# Patient Record
Sex: FEMALE | Race: WHITE | Employment: FULL TIME | URBAN - METROPOLITAN AREA
[De-identification: names, ages, dates, MRNs, and addresses within clinical notes are randomized per-mention and may not be internally consistent; named-entity substitution may affect disease eponyms.]

---

## 2017-04-26 DIAGNOSIS — E03.9 HYPOTHYROIDISM, UNSPECIFIED TYPE: ICD-10-CM

## 2017-04-26 RX ORDER — LEVOTHYROXINE SODIUM 100 UG/1
TABLET ORAL
Qty: 30 TAB | Refills: 0 | Status: SHIPPED | OUTPATIENT
Start: 2017-04-26 | End: 2017-06-08 | Stop reason: DRUGHIGH

## 2017-06-07 ENCOUNTER — HOSPITAL ENCOUNTER (OUTPATIENT)
Dept: LAB | Age: 52
Discharge: HOME | End: 2017-06-07

## 2017-06-07 ENCOUNTER — OFFICE VISIT (OUTPATIENT)
Dept: FAMILY MEDICINE CLINIC | Age: 52
End: 2017-06-07

## 2017-06-07 VITALS
TEMPERATURE: 96.7 F | SYSTOLIC BLOOD PRESSURE: 182 MMHG | DIASTOLIC BLOOD PRESSURE: 83 MMHG | WEIGHT: 208 LBS | HEIGHT: 64 IN | OXYGEN SATURATION: 98 % | RESPIRATION RATE: 16 BRPM | BODY MASS INDEX: 35.51 KG/M2 | HEART RATE: 68 BPM

## 2017-06-07 DIAGNOSIS — R73.03 PRE-DIABETES: ICD-10-CM

## 2017-06-07 DIAGNOSIS — I15.9 SECONDARY HYPERTENSION: ICD-10-CM

## 2017-06-07 DIAGNOSIS — Z72.0 TOBACCO ABUSE: ICD-10-CM

## 2017-06-07 DIAGNOSIS — E03.9 ACQUIRED HYPOTHYROIDISM: ICD-10-CM

## 2017-06-07 DIAGNOSIS — G89.29 CHRONIC BILATERAL BACK PAIN, UNSPECIFIED BACK LOCATION: ICD-10-CM

## 2017-06-07 DIAGNOSIS — Z00.00 REGULAR CHECK-UP: Primary | ICD-10-CM

## 2017-06-07 DIAGNOSIS — M54.9 CHRONIC BILATERAL BACK PAIN, UNSPECIFIED BACK LOCATION: ICD-10-CM

## 2017-06-07 DIAGNOSIS — Z12.11 COLON CANCER SCREENING: ICD-10-CM

## 2017-06-07 DIAGNOSIS — M43.15 SPONDYLOLISTHESIS OF THORACOLUMBAR REGION: ICD-10-CM

## 2017-06-07 LAB
ALBUMIN SERPL BCP-MCNC: 3.9 G/DL (ref 3.4–5)
ALBUMIN/GLOB SERPL: 1.3 {RATIO} (ref 0.8–1.7)
ALP SERPL-CCNC: 55 U/L (ref 45–117)
ALT SERPL-CCNC: 16 U/L (ref 13–56)
ANION GAP BLD CALC-SCNC: 8 MMOL/L (ref 3–18)
AST SERPL W P-5'-P-CCNC: 11 U/L (ref 15–37)
BASOPHILS # BLD AUTO: 0.1 K/UL (ref 0–0.06)
BASOPHILS # BLD: 1 % (ref 0–2)
BILIRUB SERPL-MCNC: 0.5 MG/DL (ref 0.2–1)
BUN SERPL-MCNC: 12 MG/DL (ref 7–18)
BUN/CREAT SERPL: 15 (ref 12–20)
CALCIUM SERPL-MCNC: 9.1 MG/DL (ref 8.5–10.1)
CHLORIDE SERPL-SCNC: 106 MMOL/L (ref 100–108)
CHOLEST SERPL-MCNC: 302 MG/DL
CO2 SERPL-SCNC: 27 MMOL/L (ref 21–32)
CREAT SERPL-MCNC: 0.82 MG/DL (ref 0.6–1.3)
DIFFERENTIAL METHOD BLD: ABNORMAL
EOSINOPHIL # BLD: 0.5 K/UL (ref 0–0.4)
EOSINOPHIL NFR BLD: 5 % (ref 0–5)
ERYTHROCYTE [DISTWIDTH] IN BLOOD BY AUTOMATED COUNT: 13.8 % (ref 11.6–14.5)
EST. AVERAGE GLUCOSE BLD GHB EST-MCNC: 123 MG/DL
GLOBULIN SER CALC-MCNC: 3 G/DL (ref 2–4)
GLUCOSE POC: 97 MG/DL
GLUCOSE SERPL-MCNC: 95 MG/DL (ref 74–99)
HBA1C MFR BLD: 5.9 % (ref 4.2–5.6)
HCT VFR BLD AUTO: 47.9 % (ref 35–45)
HDLC SERPL-MCNC: 39 MG/DL (ref 40–60)
HDLC SERPL: 7.7 {RATIO} (ref 0–5)
HGB BLD-MCNC: 15.3 G/DL (ref 12–16)
LDLC SERPL CALC-MCNC: 211.6 MG/DL (ref 0–100)
LIPID PROFILE,FLP: ABNORMAL
LYMPHOCYTES # BLD AUTO: 26 % (ref 21–52)
LYMPHOCYTES # BLD: 2.2 K/UL (ref 0.9–3.6)
MCH RBC QN AUTO: 29.1 PG (ref 24–34)
MCHC RBC AUTO-ENTMCNC: 31.9 G/DL (ref 31–37)
MCV RBC AUTO: 91.2 FL (ref 74–97)
MONOCYTES # BLD: 0.5 K/UL (ref 0.05–1.2)
MONOCYTES NFR BLD AUTO: 6 % (ref 3–10)
NEUTS SEG # BLD: 5.3 K/UL (ref 1.8–8)
NEUTS SEG NFR BLD AUTO: 62 % (ref 40–73)
PLATELET # BLD AUTO: 269 K/UL (ref 135–420)
PMV BLD AUTO: 10.9 FL (ref 9.2–11.8)
POTASSIUM SERPL-SCNC: 4.2 MMOL/L (ref 3.5–5.5)
PROT SERPL-MCNC: 6.9 G/DL (ref 6.4–8.2)
RBC # BLD AUTO: 5.25 M/UL (ref 4.2–5.3)
SODIUM SERPL-SCNC: 141 MMOL/L (ref 136–145)
T4 FREE SERPL-MCNC: 1.1 NG/DL (ref 0.7–1.5)
TRIGL SERPL-MCNC: 257 MG/DL (ref ?–150)
TSH SERPL DL<=0.05 MIU/L-ACNC: 10.3 UIU/ML (ref 0.36–3.74)
VLDLC SERPL CALC-MCNC: 51.4 MG/DL
WBC # BLD AUTO: 8.5 K/UL (ref 4.6–13.2)

## 2017-06-07 PROCEDURE — 84439 ASSAY OF FREE THYROXINE: CPT | Performed by: NURSE PRACTITIONER

## 2017-06-07 PROCEDURE — 85025 COMPLETE CBC W/AUTO DIFF WBC: CPT | Performed by: NURSE PRACTITIONER

## 2017-06-07 PROCEDURE — 80061 LIPID PANEL: CPT | Performed by: NURSE PRACTITIONER

## 2017-06-07 PROCEDURE — 84443 ASSAY THYROID STIM HORMONE: CPT | Performed by: NURSE PRACTITIONER

## 2017-06-07 PROCEDURE — 83036 HEMOGLOBIN GLYCOSYLATED A1C: CPT | Performed by: NURSE PRACTITIONER

## 2017-06-07 PROCEDURE — 80053 COMPREHEN METABOLIC PANEL: CPT | Performed by: NURSE PRACTITIONER

## 2017-06-07 RX ORDER — AMLODIPINE BESYLATE 5 MG/1
5 TABLET ORAL DAILY
Qty: 30 TAB | Refills: 3 | Status: SHIPPED | OUTPATIENT
Start: 2017-06-07 | End: 2017-12-06 | Stop reason: SDUPTHER

## 2017-06-07 RX ORDER — PHENOL/SODIUM PHENOLATE
20 AEROSOL, SPRAY (ML) MUCOUS MEMBRANE DAILY
Qty: 30 TAB | Refills: 3 | Status: SHIPPED | OUTPATIENT
Start: 2017-06-07 | End: 2017-12-06

## 2017-06-07 RX ORDER — NAPROXEN 500 MG/1
500 TABLET ORAL 2 TIMES DAILY WITH MEALS
Qty: 30 TAB | Refills: 3 | Status: ON HOLD | OUTPATIENT
Start: 2017-06-07 | End: 2019-11-26

## 2017-06-07 NOTE — PROGRESS NOTES
The doctor would like you to complete your mammogram and pap screening exams. You may contact [facility] for a  Free Mammogram and Pap Smear. To schedule an appointment call  [phone]  and you will be referred to the [facility] [address] [facility]  nearest you. At your appointment provide the  [address] [place] [address] as your primary care physician and they will forward your test results for inclusion in your medical records     Gave patient above info, along with FIT test with instruction, labs obtained.

## 2017-06-07 NOTE — PROGRESS NOTES
HPI  Patricia Polo is a 46 y.o. female being seen today for htn follow up. Stopped lisinopril due to coughing. Also having a lot of back pain. Worried it's bone cancer  Because that is what her mother had. Chief Complaint   Patient presents with    Medication Evaluation     Lisinopril- Coughing   . she states that she is taking her levothyroxine daily for over the past month. Tried to quit smoking and wants to do it cold turkey rather than with medication. Past Medical History:   Diagnosis Date    Diabetes ([address]     GERD (gastroesophageal reflux disease)     Hypercholesterolemia     Hypertension     Thyroid disease     hypothyroid         ROS  Patient states that she is feeling well. Denies complaints of chest pain, shortness of breath, swelling of legs, dizziness or weakness. she denies nausea, vomiting or diarrhea. Current Outpatient Prescriptions   Medication Sig    naproxen (NAPROSYN) 500 mg tablet Take 1 Tab by mouth two (2) times daily (with meals).  amLODIPine (NORVASC) 5 mg tablet Take 1 Tab by mouth daily.  Omeprazole delayed release (PRILOSEC D/R) 20 mg tablet Take 1 Tab by mouth daily.  levothyroxine (SYNTHROID) 100 mcg tablet TAKE ONE TABLET BY MOUTH ONCE DAILY BEFORE BREAKFAST    Omeprazole delayed release (PRILOSEC D/R) 20 mg tablet Take 1 Tab by mouth daily.  ibuprofen (MOTRIN) 200 mg tablet Take 3 Tabs by mouth every six (6) hours as needed for Pain.  lisinopril (PRINIVIL, ZESTRIL) 20 mg tablet Take 1 Tab by mouth daily.  omeprazole (PRILOSEC) 20 mg capsule Take 20 mg by mouth daily. No current facility-administered medications for this visit. PE  Visit Vitals    /83 (BP 1 Location: Left arm, BP Patient Position: Sitting)    Pulse 68    Temp 96.7 °F (35.9 °C) (Oral)    Resp 16    Ht 5' 4\" (1.626 m)    Wt 208 lb (94.3 kg)    SpO2 98%    BMI 35.7 kg/m2        Alert and oriented with normal mood and affect.  she is well developed and well nourished . Lungs are clear without wheezing. Heart rate is regular without murmurs or gallops. There is no lower extremity edema. Thyroid exam normal and no carotid bruits bilaterally. Results for orders placed or performed in visit on 06/07/17   AMB POC GLUCOSE BLOOD, BY GLUCOSE MONITORING DEVICE   Result Value Ref Range    Glucose POC 97 mg/dL         Assessment and Plan:        ICD-10-CM ICD-9-CM    1. Regular check-up Z00.00 V70.0 AMB POC GLUCOSE BLOOD, BY GLUCOSE MONITORING DEVICE   2. Secondary hypertension N50.4 985.10 METABOLIC PANEL, COMPREHENSIVE      LIPID PANEL   3. Chronic bilateral back pain, unspecified back location M54.9 724.5     G89.29 338.29    4. Colon cancer screening Z12.11 V76.51 OCCULT BLOOD, IMMUNOASSAY (FIT)      OCCULT BLOOD, IMMUNOASSAY (FIT)   5. Acquired hypothyroidism E03.9 244.9 TSH 3RD GENERATION      T4, FREE   6. Pre-diabetes R73.03 790.29 CBC WITH AUTOMATED DIFF      HEMOGLOBIN A1C WITH EAG   7. Spondylolisthesis of thoracolumbar region M43.15 738.4    8. Tobacco abuse Z72.0 305. 1    pt chose amlodipine over losartan due to cost  Labs  Discussed CAD risk with mother's hx  Reviewed all images previously - hx of spondylolysis and arthritis in back so will try exercises first  Try naproxen over ibuprofen for pain  Pt concerned about side effects of lipid medications such as statins  Advised to quit smoking      Asiya Carpenter NP

## 2017-06-07 NOTE — PROGRESS NOTES
No chief complaint on file. 1. Have you been to the ER, urgent care clinic since your last visit? Hospitalized since your last visit? No    2. Have you seen or consulted any other health care providers outside of the [address] [place] [address] since your last visit? No    3. When was your last Pap smear? No  When was your last Mammogram? No  When was your last Colon screening? No    PMH/FH/Social Hx reviewed and updated as needed      Applicable screenings reviewed and updated as needed  Medication reconciliation performed. Patient does need medication refills. [employer]  Health Maintenance reviewed.

## 2017-06-07 NOTE — PATIENT INSTRUCTIONS
You have been referred for specialty care at  [address] [place] [address] in [place]. Services are provided at a sliding scale rate based on your income. Please call [phone] or [phone] to start the screening process. You can leave them a message with your information, and you will receive a call back. They will only try to contact you two times, so if you miss the call or have not heard from them 2 weeks after your call, please call and leave another message. Once you receive your letter of acceptance, bring it to the [place] [phone] and we can schedule your appointment. Ankylosing Spondylitis: Exercises  Your Care Instructions  Here are some examples of typical rehabilitation exercises for your condition. Start each exercise slowly. Ease off the exercise if you start to have pain. Your doctor or physical therapist will tell you when you can start these exercises and which ones will work best for you. How to do the exercises  Back stretches    Note: Exercises for ankylosing spondylitis should be gentle and frequent. It is good to do these exercises twice each day. Do not do them first thing in the morning, when you may feel more stiff. 1. Get down on your hands and knees on the floor. 2. Relax your head and allow it to droop. 3. Round your back up toward the ceiling until you feel a nice stretch in your upper, middle, and lower back. 4. Hold this stretch for as long as it feels comfortable, or about 15 to 30 seconds. 5. Return to the starting position with a flat back while you are on your hands and knees. 6. Let your back sway by pressing your stomach toward the floor. Lift your buttocks toward the ceiling. 7. Hold each position for 15 to 30 seconds. Repeat 2 to 4 times. Chest expansion    1. Sit comfortably with your feet shoulder-width apart. You can also do this exercise standing up. 2. Look straight ahead, and do not allow your head to tilt back.  As you take a deep breath, open your arms out to the sides and roll your arms back. Your palms will turn outward, and you will feel a stretch across your chest.  3. Breathe normally as you hold this stretch for 15 to 30 seconds. 4. Lower your arms to your sides and let your palms turn back toward your legs as you slowly let out your breath. 5. Repeat 2 to 4 times. Upper back and shoulder stretch    1. Stand up straight, or sit in a firm chair. 2. Looking straight ahead, breathe in as you raise both arms over your head and reach toward the ceiling. Do not allow your head to tilt back. 3. Reach back with your arms to stretch your shoulders. 4. Breathe normally as you hold this stretch for 15 to 30 seconds. 5. Return to the starting position. 6. Repeat 2 to 4 times. Neck stretches    1. Sit in a firm chair, or stand up straight. 2. Keeping your chin level, turn your head to the right, and hold for 15 to 30 seconds. 3. Turn your head to the left and hold for 15 to 30 seconds. 4. Repeat 2 to 4 times to each side. 5. Next, you can do some head tilts. Keeping your chin pointing straight ahead, tip your right ear to your right shoulder, and hold for 15 to 30 seconds. 6. Tilt your head to the left and hold for 15 to 30 seconds. 7. Repeat 2 to 4 times to each side. Press-up back extension    1. Lie on your stomach, supporting your body with your forearms. 2. Press your elbows down into the floor to raise your upper back. As you do this, relax your stomach muscles and allow your back to arch without using your back muscles. As your press up, do not let your hips or pelvis come off the floor. 3. Hold for 15 to 30 seconds, then relax. 4. Repeat 2 to 4 times. Alternate arm and leg (bird dog) exercise    Note: Do this exercise slowly. Try to keep your body straight at all times, and do not let one hip drop lower than the other. 1. Start on the floor, on your hands and knees. 2. Tighten your belly muscles.   3. Raise one leg off the floor, and hold it straight out behind you. Be careful not to let your hip drop down, because that will twist your trunk. 4. Hold for about 6 seconds, then lower your leg and switch to the other leg. 5. Repeat 8 to 12 times on each leg. 6. Over time, work up to holding for 10 to 30 seconds each time. 7. If you feel stable and secure with your leg raised, try raising the opposite arm straight out in front of you at the same time. Follow-up care is a key part of your treatment and safety. Be sure to make and go to all appointments, and call your doctor if you are having problems. It's also a good idea to know your test results and keep a list of the medicines you take. Where can you learn more? Go to http://ralph-paige.info/. Enter L070 in the search box to learn more about \"Ankylosing Spondylitis: Exercises. \"  Current as of: May 23, 2016  Content Version: 11.2  © 7029-3007 [employer]. Care instructions adapted under license by [employer] (which disclaims liability or warranty for this information). If you have questions about a medical condition or this instruction, always ask your healthcare professional. [address] any warranty or liability for your use of this information.

## 2017-06-08 RX ORDER — LEVOTHYROXINE SODIUM 112 UG/1
112 TABLET ORAL
Qty: 30 TAB | Refills: 3 | Status: SHIPPED | OUTPATIENT
Start: 2017-06-08 | End: 2017-12-06 | Stop reason: SDUPTHER

## 2017-06-13 DIAGNOSIS — E78.5 HYPERLIPIDEMIA, UNSPECIFIED HYPERLIPIDEMIA TYPE: Primary | ICD-10-CM

## 2017-06-13 RX ORDER — ATORVASTATIN CALCIUM 20 MG/1
20 TABLET, FILM COATED ORAL DAILY
Qty: 30 TAB | Refills: 3 | Status: SHIPPED | OUTPATIENT
Start: 2017-06-13 | End: 2017-12-06 | Stop reason: SDUPTHER

## 2017-06-19 ENCOUNTER — HOSPITAL ENCOUNTER (OUTPATIENT)
Dept: LAB | Age: 52
Discharge: HOME | End: 2017-06-19

## 2017-06-19 PROCEDURE — 82274 ASSAY TEST FOR BLOOD FECAL: CPT | Performed by: NURSE PRACTITIONER

## 2017-06-25 LAB — HEMOCCULT STL QL IA: NEGATIVE

## 2017-12-06 ENCOUNTER — OFFICE VISIT (OUTPATIENT)
Dept: FAMILY MEDICINE CLINIC | Age: 52
End: 2017-12-06

## 2017-12-06 VITALS
RESPIRATION RATE: 16 BRPM | OXYGEN SATURATION: 98 % | WEIGHT: 214 LBS | SYSTOLIC BLOOD PRESSURE: 158 MMHG | HEIGHT: 64 IN | BODY MASS INDEX: 36.54 KG/M2 | HEART RATE: 71 BPM | DIASTOLIC BLOOD PRESSURE: 79 MMHG | TEMPERATURE: 97.9 F

## 2017-12-06 DIAGNOSIS — Z00.00 REGULAR CHECK-UP: Primary | ICD-10-CM

## 2017-12-06 DIAGNOSIS — I15.9 SECONDARY HYPERTENSION: ICD-10-CM

## 2017-12-06 DIAGNOSIS — Z00.00 HEALTHCARE MAINTENANCE: ICD-10-CM

## 2017-12-06 DIAGNOSIS — E03.9 ACQUIRED HYPOTHYROIDISM: ICD-10-CM

## 2017-12-06 DIAGNOSIS — E78.5 HYPERLIPIDEMIA, UNSPECIFIED HYPERLIPIDEMIA TYPE: ICD-10-CM

## 2017-12-06 LAB
BILIRUB UR QL STRIP: NEGATIVE
GLUCOSE POC: 127 MG/DL
GLUCOSE UR-MCNC: NEGATIVE MG/DL
KETONES P FAST UR STRIP-MCNC: NEGATIVE MG/DL
PH UR STRIP: 6 [PH] (ref 4.6–8)
PROT UR QL STRIP: NEGATIVE
SP GR UR STRIP: 1 (ref 1–1.03)
UA UROBILINOGEN AMB POC: NORMAL (ref 0.2–1)
URINALYSIS CLARITY POC: CLEAR
URINALYSIS COLOR POC: YELLOW
URINE BLOOD POC: NEGATIVE
URINE LEUKOCYTES POC: NEGATIVE
URINE NITRITES POC: NEGATIVE

## 2017-12-06 RX ORDER — ATORVASTATIN CALCIUM 20 MG/1
20 TABLET, FILM COATED ORAL DAILY
Qty: 30 TAB | Refills: 5 | Status: SHIPPED | OUTPATIENT
Start: 2017-12-06 | End: 2019-06-13 | Stop reason: ALTCHOICE

## 2017-12-06 RX ORDER — LEVOTHYROXINE SODIUM 112 UG/1
112 TABLET ORAL
Qty: 30 TAB | Refills: 5 | Status: SHIPPED | OUTPATIENT
Start: 2017-12-06 | End: 2019-06-13 | Stop reason: SDUPTHER

## 2017-12-06 RX ORDER — AMLODIPINE BESYLATE 5 MG/1
5 TABLET ORAL DAILY
Qty: 30 TAB | Refills: 5 | Status: SHIPPED | OUTPATIENT
Start: 2017-12-06 | End: 2019-06-13 | Stop reason: ALTCHOICE

## 2017-12-06 NOTE — PROGRESS NOTES
Discharge instructions reviewed with patient    Medication list and understanding of medications reviewed with patient. OTC and herbal medications reviewed and added to med list if applicable  Barriers to adherence assessed. Guidance given regarding new medications this visit, including reason for taking this medicine, and common side effects. Given AVS and Good RX card.

## 2017-12-06 NOTE — PROGRESS NOTES
HPI  Evelia Merlin is a 45 y.o. female being seen today for   Chief Complaint   Patient presents with    Medication Refill   .  she states that she needs med refill on synthroid, blood pressure med and lipitor. Takes meds inconsistently and still smoking 1ppd. Did not take bp meds today    Past Medical History:   Diagnosis Date    Diabetes ([address]     GERD (gastroesophageal reflux disease)     Hypercholesterolemia     Hypertension     Thyroid disease     hypothyroid         ROS  Patient states that she is feeling well. Denies complaints of chest pain, shortness of breath, swelling of legs, dizziness or weakness. she denies nausea, vomiting or diarrhea. Current Outpatient Prescriptions   Medication Sig    atorvastatin (LIPITOR) 20 mg tablet Take 1 Tab by mouth daily.  levothyroxine (SYNTHROID) 112 mcg tablet Take 1 Tab by mouth Daily (before breakfast).  amLODIPine (NORVASC) 5 mg tablet Take 1 Tab by mouth daily.  naproxen (NAPROSYN) 500 mg tablet Take 1 Tab by mouth two (2) times daily (with meals).  Omeprazole delayed release (PRILOSEC D/R) 20 mg tablet Take 1 Tab by mouth daily. No current facility-administered medications for this visit. PE  Visit Vitals    /79 (BP 1 Location: Left arm, BP Patient Position: Sitting)    Pulse 71    Temp 97.9 °F (36.6 °C) (Oral)    Resp 16    Ht 5' 4\" (1.626 m)    Wt 214 lb (97.1 kg)    SpO2 98%    BMI 36.73 kg/m2        Alert and oriented with normal mood and affect. she is well developed and well nourished . Lungs are clear without wheezing. Heart rate is regular without murmurs or gallops. There is no lower extremity edema. Assessment and Plan:        ICD-10-CM ICD-9-CM    1. Regular check-up Z00.00 V70.0 AMB POC GLUCOSE BLOOD, BY GLUCOSE MONITORING DEVICE   2. Healthcare maintenance Z00.00 V70.0 AMB POC URINALYSIS DIP STICK AUTO W/O MICRO   3.  Hyperlipidemia, unspecified hyperlipidemia type E78.5 272.4 atorvastatin (LIPITOR) 20 mg tablet   4. Acquired hypothyroidism E03.9 244.9    5. Secondary hypertension I15.9 405.99      refill as current. Advised on need to take meds consistenly. Reviewed importance of stopping smoking.   She is contemplative        Yoseph Gonzales MD

## 2019-06-13 ENCOUNTER — OFFICE VISIT (OUTPATIENT)
Dept: FAMILY MEDICINE CLINIC | Age: 54
End: 2019-06-13

## 2019-06-13 ENCOUNTER — HOSPITAL ENCOUNTER (OUTPATIENT)
Dept: LAB | Age: 54
Discharge: HOME | End: 2019-06-13

## 2019-06-13 VITALS
HEART RATE: 72 BPM | SYSTOLIC BLOOD PRESSURE: 164 MMHG | BODY MASS INDEX: 35.2 KG/M2 | HEIGHT: 64 IN | TEMPERATURE: 98.1 F | OXYGEN SATURATION: 98 % | WEIGHT: 206.2 LBS | RESPIRATION RATE: 20 BRPM | DIASTOLIC BLOOD PRESSURE: 90 MMHG

## 2019-06-13 DIAGNOSIS — E03.9 ACQUIRED HYPOTHYROIDISM: ICD-10-CM

## 2019-06-13 DIAGNOSIS — I10 ESSENTIAL HYPERTENSION: ICD-10-CM

## 2019-06-13 DIAGNOSIS — E78.5 HYPERLIPIDEMIA, UNSPECIFIED HYPERLIPIDEMIA TYPE: ICD-10-CM

## 2019-06-13 DIAGNOSIS — Z87.891 FORMER SMOKER: ICD-10-CM

## 2019-06-13 DIAGNOSIS — Z76.89 ENCOUNTER TO ESTABLISH CARE: ICD-10-CM

## 2019-06-13 DIAGNOSIS — Z12.39 BREAST CANCER SCREENING: ICD-10-CM

## 2019-06-13 DIAGNOSIS — R73.03 PREDIABETES: ICD-10-CM

## 2019-06-13 DIAGNOSIS — Z76.89 ENCOUNTER TO ESTABLISH CARE: Primary | ICD-10-CM

## 2019-06-13 DIAGNOSIS — Z12.11 COLON CANCER SCREENING: ICD-10-CM

## 2019-06-13 LAB
ALBUMIN SERPL-MCNC: 4.1 G/DL (ref 3.4–5)
ALBUMIN/GLOB SERPL: 1.3 {RATIO} (ref 0.8–1.7)
ALP SERPL-CCNC: 45 U/L (ref 45–117)
ALT SERPL-CCNC: 20 U/L (ref 13–56)
AMPHET UR QL SCN: NEGATIVE
ANION GAP SERPL CALC-SCNC: 9 MMOL/L (ref 3–18)
APPEARANCE UR: CLEAR
AST SERPL-CCNC: 15 U/L (ref 15–37)
BARBITURATES UR QL SCN: NEGATIVE
BASOPHILS # BLD: 0.1 K/UL (ref 0–0.1)
BASOPHILS NFR BLD: 1 % (ref 0–2)
BENZODIAZ UR QL: NEGATIVE
BILIRUB SERPL-MCNC: 0.5 MG/DL (ref 0.2–1)
BILIRUB UR QL: NEGATIVE
BUN SERPL-MCNC: 16 MG/DL (ref 7–18)
BUN/CREAT SERPL: 16 (ref 12–20)
CALCIUM SERPL-MCNC: 9.5 MG/DL (ref 8.5–10.1)
CANNABINOIDS UR QL SCN: NEGATIVE
CHLORIDE SERPL-SCNC: 105 MMOL/L (ref 100–108)
CHOLEST SERPL-MCNC: 294 MG/DL
CO2 SERPL-SCNC: 28 MMOL/L (ref 21–32)
COCAINE UR QL SCN: NEGATIVE
COLOR UR: YELLOW
CREAT SERPL-MCNC: 1 MG/DL (ref 0.6–1.3)
DIFFERENTIAL METHOD BLD: NORMAL
EOSINOPHIL # BLD: 0.4 K/UL (ref 0–0.4)
EOSINOPHIL NFR BLD: 5 % (ref 0–5)
ERYTHROCYTE [DISTWIDTH] IN BLOOD BY AUTOMATED COUNT: 14.1 % (ref 11.6–14.5)
EST. AVERAGE GLUCOSE BLD GHB EST-MCNC: 114 MG/DL
GLOBULIN SER CALC-MCNC: 3.1 G/DL (ref 2–4)
GLUCOSE POC: 95 MG/DL
GLUCOSE SERPL-MCNC: 90 MG/DL (ref 74–99)
GLUCOSE UR STRIP.AUTO-MCNC: NEGATIVE MG/DL
HBA1C MFR BLD: 5.6 % (ref 4.2–5.6)
HCT VFR BLD AUTO: 44.8 % (ref 35–45)
HDLC SERPL-MCNC: 59 MG/DL (ref 40–60)
HDLC SERPL: 5 {RATIO} (ref 0–5)
HDSCOM,HDSCOM: NORMAL
HGB BLD-MCNC: 14.5 G/DL (ref 12–16)
HGB UR QL STRIP: NEGATIVE
KETONES UR QL STRIP.AUTO: NEGATIVE MG/DL
LDLC SERPL CALC-MCNC: 203.4 MG/DL (ref 0–100)
LEUKOCYTE ESTERASE UR QL STRIP.AUTO: NEGATIVE
LIPID PROFILE,FLP: ABNORMAL
LYMPHOCYTES # BLD: 2.3 K/UL (ref 0.9–3.6)
LYMPHOCYTES NFR BLD: 29 % (ref 21–52)
MCH RBC QN AUTO: 30.3 PG (ref 24–34)
MCHC RBC AUTO-ENTMCNC: 32.4 G/DL (ref 31–37)
MCV RBC AUTO: 93.7 FL (ref 74–97)
METHADONE UR QL: NEGATIVE
MONOCYTES # BLD: 0.5 K/UL (ref 0.05–1.2)
MONOCYTES NFR BLD: 7 % (ref 3–10)
NEUTS SEG # BLD: 4.6 K/UL (ref 1.8–8)
NEUTS SEG NFR BLD: 58 % (ref 40–73)
NITRITE UR QL STRIP.AUTO: NEGATIVE
OPIATES UR QL: NEGATIVE
PCP UR QL: NEGATIVE
PH UR STRIP: 6.5 [PH] (ref 5–8)
PLATELET # BLD AUTO: 289 K/UL (ref 135–420)
PMV BLD AUTO: 10.8 FL (ref 9.2–11.8)
POTASSIUM SERPL-SCNC: 4.2 MMOL/L (ref 3.5–5.5)
PROT SERPL-MCNC: 7.2 G/DL (ref 6.4–8.2)
PROT UR STRIP-MCNC: NEGATIVE MG/DL
RBC # BLD AUTO: 4.78 M/UL (ref 4.2–5.3)
SODIUM SERPL-SCNC: 142 MMOL/L (ref 136–145)
SP GR UR REFRACTOMETRY: 1.01 (ref 1–1.03)
T4 FREE SERPL-MCNC: 0.6 NG/DL (ref 0.7–1.5)
TRIGL SERPL-MCNC: 158 MG/DL (ref ?–150)
TSH SERPL DL<=0.05 MIU/L-ACNC: 71.4 UIU/ML (ref 0.36–3.74)
UROBILINOGEN UR QL STRIP.AUTO: 0.2 EU/DL (ref 0.2–1)
VLDLC SERPL CALC-MCNC: 31.6 MG/DL
WBC # BLD AUTO: 7.8 K/UL (ref 4.6–13.2)

## 2019-06-13 PROCEDURE — 84443 ASSAY THYROID STIM HORMONE: CPT

## 2019-06-13 PROCEDURE — 81003 URINALYSIS AUTO W/O SCOPE: CPT

## 2019-06-13 PROCEDURE — 84439 ASSAY OF FREE THYROXINE: CPT

## 2019-06-13 PROCEDURE — 80074 ACUTE HEPATITIS PANEL: CPT

## 2019-06-13 PROCEDURE — 87389 HIV-1 AG W/HIV-1&-2 AB AG IA: CPT

## 2019-06-13 PROCEDURE — 80061 LIPID PANEL: CPT

## 2019-06-13 PROCEDURE — 83036 HEMOGLOBIN GLYCOSYLATED A1C: CPT

## 2019-06-13 PROCEDURE — 80307 DRUG TEST PRSMV CHEM ANLYZR: CPT

## 2019-06-13 PROCEDURE — 85025 COMPLETE CBC W/AUTO DIFF WBC: CPT

## 2019-06-13 PROCEDURE — 80053 COMPREHEN METABOLIC PANEL: CPT

## 2019-06-13 RX ORDER — LEVOTHYROXINE SODIUM 112 UG/1
112 TABLET ORAL
Qty: 30 TAB | Refills: 5 | Status: SHIPPED | COMMUNITY
Start: 2019-06-13 | End: 2019-09-23 | Stop reason: DRUGHIGH

## 2019-06-13 RX ORDER — AMLODIPINE BESYLATE AND ATORVASTATIN CALCIUM 5; 20 MG/1; MG/1
1 TABLET, FILM COATED ORAL DAILY
Qty: 90 TAB | Refills: 1 | Status: SHIPPED | COMMUNITY
Start: 2019-06-13 | End: 2019-11-27

## 2019-06-13 RX ORDER — LEVOTHYROXINE SODIUM 112 UG/1
112 TABLET ORAL
Qty: 30 TAB | Refills: 1 | Status: SHIPPED | OUTPATIENT
Start: 2019-06-13 | End: 2019-09-23 | Stop reason: DRUGHIGH

## 2019-06-13 RX ORDER — AMLODIPINE BESYLATE AND ATORVASTATIN CALCIUM 5; 20 MG/1; MG/1
1 TABLET, FILM COATED ORAL DAILY
Qty: 30 TAB | Refills: 0 | Status: SHIPPED | COMMUNITY
Start: 2019-06-13 | End: 2019-06-24 | Stop reason: SDUPTHER

## 2019-06-13 NOTE — LETTER
6/13/2019 [facility] [address] [facility], [address] Raymond Merritt, 1965, is picking up the following medications ordered from the [facility]: STOCK:  CADUET 5/20 MG #30 Estephanie Pollack Patient's Signature: _____________________________ Today's Date: 6/13/2019

## 2019-06-13 NOTE — PROGRESS NOTES
Chief Complaint   Patient presents with   [address] Hypothyroidism     \"I'm out of my prescrioptions for my thyroid\"    Hypertension     \"I would like to be checked for that\"     1. When and where did you last receive medical care? Yes When: [facility]    2. When and where did you last have preventive care such as   Mammogram:  2017  pap smears: More than 10 years  colon screenin2017    3. What is your current living situation (for example, live alone, live in home with immediate family members)? Lives alone  4. Do you have any problems with communication such trouble seeing, hearing, or understanding instructions? No    5. Do you have an advance directive? This is a document that you can give to family members with instructions for how you would want them to make health care decisions for you if you were unable to speak for yourself. (For example, unconscious, delerious)No    PMH/FH/Social Hx reviewed and updated as needed     Applicable screenings reviewed and updated as needed  Medication reconciliation performed. Patient does need medication refills. Verified patient name, , demographics and orders. Venipuncture for labs performed using 21G x 3/4\" butterfly Right AC using aseptic technique. Skin intact and dry. No active bleeding or complications noted. Bandaid dressing applied. Health Maintenance reviewed.

## 2019-06-13 NOTE — PROGRESS NOTES
06/13/19    PCP: Maddison Velázquez NP    Chief Complaint   Patient presents with   [address] Hypothyroidism     \"I'm out of my prescrioptions for my thyroid\"    Hypertension     \"I would like to be checked for that\"        HISTORY OF PRESENT ILLNESS  Saumya Barrett  is a 52 y.o. female with pmhx of HTN, HLD, hypothyroidism, and prediabetes whom presents to establish care and for medication refills. She was previously a patient of the [address] Patient has a h/o Hypothyroidism  Has been managed lillian Synthroid 112 mcg daily. Reports she has been out of medication for about 1 week. Last TSH 10.30 in 6/17   She reports feelings of fatigue  Denies any other symptoms       She also has a history of HTN/HLD/Pre diabetes  She is not taking her Amlodipine or Lipitor as she is out   Her DM is managed by diet. She is not on any medications. She denies any Chest pains, SOB, LE swelling, or Headaches. Health maintenance:   Colonoscopy: Never- FIT test in 2017 , negative  Mammogram: 2017   Pap: unknown, patient with h/o hysterectomy secondary to abnormal pap. Patient Active Problem List    Diagnosis Date Noted    Hyperlipidemia 06/13/2017    Spondylolisthesis of thoracolumbar region 06/07/2017    Acquired hypothyroidism 06/07/2017    Pre-diabetes 06/07/2017    Chronic bilateral back pain 06/07/2017    Secondary hypertension 06/07/2017    Tobacco abuse 06/07/2017    Colon cancer screening 09/12/2016     Current Outpatient Medications   Medication Sig Dispense Refill    amLODIPine-atorvastatin (CADUET) 5-20 mg per tablet Take 1 Tab by mouth daily. Indications: high cholesterol, high blood pressure 30 Tab 0    amLODIPine-atorvastatin (CADUET) 5-20 mg per tablet Take 1 Tab by mouth daily. Indications: high cholesterol, high blood pressure 90 Tab 1    levothyroxine (SYNTHROID) 112 mcg tablet Take 1 Tab by mouth Daily (before breakfast).  Indications: hypothyroidism 30 Tab 5    levothyroxine (SYNTHROID) 112 mcg tablet Take 1 Tab by mouth Daily (before breakfast). Indications: hypothyroidism 30 Tab 1    naproxen (NAPROSYN) 500 mg tablet Take 1 Tab by mouth two (2) times daily (with meals). 30 Tab 3    Omeprazole delayed release (PRILOSEC D/R) 20 mg tablet Take 1 Tab by mouth daily. 30 Tab 5     Allergies   Allergen Reactions    Tramadol Vertigo     Pt reported blacking out     Past Medical History:   Diagnosis Date    [address] ([facility] [address]     Diabetes Type II reported by patient    GERD (gastroesophageal reflux disease)     Hx of abnormal cervical Pap smear     s/p hysterectomy     Hypercholesterolemia     Hypertension     Thyroid disease     hypothyroid     Past Surgical History:   Procedure Laterality Date    HX CARPAL TUNNEL RELEASE      right hand    HX GYN      partial hysterectomy    HX PARTIAL HYSTERECTOMY       Family History   Problem Relation Age of Onset    Thyroid Disease Mother     Depression Mother     Hypertension Father     Heart Disease Father     Thyroid Disease Sister     Hypertension Sister     Diabetes Maternal Aunt     Diabetes Sister     Eczema Sister     No Known Problems Brother     Alzheimer Paternal Grandmother     Diabetes Paternal Grandmother      Social History     Tobacco Use    Smoking status: Former Smoker     Packs/day: 1.00     Last attempt to quit: 2019     Years since quittin.0    Smokeless tobacco: Never Used   Substance Use Topics    Alcohol use: Yes     Frequency: Monthly or less     Drinks per session: 3 or 4     Binge frequency: Never     Comment: occasional on holidays       Review of Systems   Constitutional: Positive for malaise/fatigue. Negative for chills, diaphoresis, fever and weight loss. HENT: Negative. Eyes: Negative. Respiratory: Negative. Cardiovascular: Negative. Gastrointestinal: Negative. Genitourinary: Negative. Musculoskeletal: Negative. Neurological: Negative. Endo/Heme/Allergies: Negative. Psychiatric/Behavioral: Negative. Visit Vitals  /90 (BP 1 Location: Left arm, BP Patient Position: Sitting)   Pulse 72   Temp 98.1 °F (36.7 °C) (Oral)   Resp 20   Ht 5' 4\" (1.626 m)   Wt 206 lb 3.2 oz (93.5 kg)   SpO2 98%   BMI 35.39 kg/m²       Pain Scale: 0 - No pain/10    Pain Location:      Physical Exam   Constitutional: She is oriented to person, place, and time and well-developed, well-nourished, and in no distress. HENT:   Head: Normocephalic. Eyes: Pupils are equal, round, and reactive to light. Neck: Normal range of motion. Neck supple. Cardiovascular: Normal rate, regular rhythm and normal heart sounds. Pulmonary/Chest: Effort normal and breath sounds normal.   Abdominal: Soft. Bowel sounds are normal.   Neurological: She is alert and oriented to person, place, and time. Skin: Skin is warm and dry. Psychiatric: Mood and affect normal.   Vitals reviewed. ASSESSMENT and PLAN    ICD-10-CM ICD-9-CM    1. Encounter to establish care Z76.89 V65.8 TSH 3RD GENERATION      HEPATITIS PANEL, ACUTE      LIPID PANEL      T4, FREE      CBC WITH AUTOMATED DIFF      METABOLIC PANEL, COMPREHENSIVE      HEMOGLOBIN A1C WITH EAG      HIV 1/2 AG/AB, 4TH GENERATION,W RFLX CONFIRM      AMB POC GLUCOSE BLOOD, BY GLUCOSE MONITORING DEVICE      URINALYSIS W/ RFLX MICROSCOPIC      DRUG SCREEN, URINE   2. Acquired hypothyroidism E03.9 244.9 TSH 3RD GENERATION      T4, FREE      levothyroxine (SYNTHROID) 112 mcg tablet      levothyroxine (SYNTHROID) 112 mcg tablet   3. Prediabetes R73.03 790.29 TSH 3RD GENERATION      LIPID PANEL      T4, FREE      METABOLIC PANEL, COMPREHENSIVE      HEMOGLOBIN A1C WITH EAG   4. Colon cancer screening Z12.11 V76.51 OCCULT BLOOD IMMUNOASSAY,DIAGNOSTIC   5.  Hyperlipidemia, unspecified hyperlipidemia type E78.5 272.4 LIPID PANEL      METABOLIC PANEL, COMPREHENSIVE      amLODIPine-atorvastatin (CADUET) 5-20 mg per tablet amLODIPine-atorvastatin (CADUET) 5-20 mg per tablet   6. Essential hypertension S66 526.4 METABOLIC PANEL, COMPREHENSIVE      amLODIPine-atorvastatin (CADUET) 5-20 mg per tablet      amLODIPine-atorvastatin (CADUET) 5-20 mg per tablet   7. Breast cancer screening Z12.31 U41.71 REFERRAL TO [facility]   8. Former smoker Z87.891 V15.82      Diagnoses and all orders for this visit:    1. Encounter to establish care  -     TSH 3RD GENERATION; Future  -     HEPATITIS PANEL, ACUTE; Future  -     LIPID PANEL; Future  -     T4, FREE; Future  -     CBC WITH AUTOMATED DIFF; Future  -     METABOLIC PANEL, COMPREHENSIVE; Future  -     HEMOGLOBIN A1C WITH EAG; Future  -     HIV 1/2 AG/AB, 4TH GENERATION,W RFLX CONFIRM; Future  -     AMB POC GLUCOSE BLOOD, BY GLUCOSE MONITORING DEVICE  -     URINALYSIS W/ RFLX MICROSCOPIC; Future  -     DRUG SCREEN, URINE; Future    2. Acquired hypothyroidism  -     TSH 3RD GENERATION; Future  -     T4, FREE; Future  -     levothyroxine (SYNTHROID) 112 mcg tablet; Take 1 Tab by mouth Daily (before breakfast). Indications: hypothyroidism  -     levothyroxine (SYNTHROID) 112 mcg tablet; Take 1 Tab by mouth Daily (before breakfast). Indications: hypothyroidism    3. Prediabetes  -     TSH 3RD GENERATION; Future  -     LIPID PANEL; Future  -     T4, FREE; Future  -     METABOLIC PANEL, COMPREHENSIVE; Future  -     HEMOGLOBIN A1C WITH EAG; Future    4. Colon cancer screening  -     OCCULT BLOOD IMMUNOASSAY,DIAGNOSTIC; Future    5. Hyperlipidemia, unspecified hyperlipidemia type  -     LIPID PANEL; Future  -     METABOLIC PANEL, COMPREHENSIVE; Future  -     amLODIPine-atorvastatin (CADUET) 5-20 mg per tablet; Take 1 Tab by mouth daily. Indications: high cholesterol, high blood pressure  -     amLODIPine-atorvastatin (CADUET) 5-20 mg per tablet; Take 1 Tab by mouth daily. Indications: high cholesterol, high blood pressure    6. Essential hypertension  -     METABOLIC PANEL, COMPREHENSIVE;  Future  - amLODIPine-atorvastatin (CADUET) 5-20 mg per tablet; Take 1 Tab by mouth daily. Indications: high cholesterol, high blood pressure  -     amLODIPine-atorvastatin (CADUET) 5-20 mg per tablet; Take 1 Tab by mouth daily. Indications: high cholesterol, high blood pressure    7. Breast cancer screening  -     REFERRAL TO [facility]    8. Former smoker      reviewed medications and side effects in detail  use of aspirin to prevent MI and TIA's discussed   Congratulations on smoking cessation, continuous support as needed  Return for pelvic/pap given h/o abnormal pap   Medications and labs per orders           Medications Discontinued During This Encounter   Medication Reason    amLODIPine (NORVASC) 5 mg tablet Alternate Therapy    atorvastatin (LIPITOR) 20 mg tablet Alternate Therapy    levothyroxine (SYNTHROID) 112 mcg tablet Reorder       Written instructions followed our verbal discussion of all information discussed above, pending tests ordered and future goals/plans. Patient expressed understanding of current diagnosis, planned testing, follow up and if needed to contact the office for any questions or concerns prior to the next visit.

## 2019-06-14 LAB
HAV IGM SER QL: NEGATIVE
HBV CORE IGM SER QL: NEGATIVE
HBV SURFACE AG SER QL: <0.1 INDEX
HBV SURFACE AG SER QL: NEGATIVE
HCV AB SER IA-ACNC: 0.02 INDEX
HCV AB SERPL QL IA: NEGATIVE
HCV COMMENT,HCGAC: NORMAL
HIV 1+2 AB+HIV1 P24 AG SERPL QL IA: NONREACTIVE
HIV12 RESULT COMMENT, HHIVC: NORMAL
SP1: NORMAL
SP2: NORMAL
SP3: NORMAL

## 2019-06-20 NOTE — PROGRESS NOTES
New patient labs reviewed. Patient with elevated cholesterol and abnormal thyroid levels. She was restarted on medications (Synthroid and Cholesterol) at visit. Will continue to follow.

## 2019-06-24 ENCOUNTER — OFFICE VISIT (OUTPATIENT)
Dept: FAMILY MEDICINE CLINIC | Age: 54
End: 2019-06-24

## 2019-06-24 ENCOUNTER — HOSPITAL ENCOUNTER (OUTPATIENT)
Dept: LAB | Age: 54
Discharge: HOME | End: 2019-06-24

## 2019-06-24 VITALS
HEART RATE: 73 BPM | HEIGHT: 64 IN | WEIGHT: 211.6 LBS | SYSTOLIC BLOOD PRESSURE: 142 MMHG | BODY MASS INDEX: 36.12 KG/M2 | OXYGEN SATURATION: 99 % | DIASTOLIC BLOOD PRESSURE: 76 MMHG | TEMPERATURE: 97.5 F | RESPIRATION RATE: 16 BRPM

## 2019-06-24 DIAGNOSIS — E78.2 MIXED HYPERLIPIDEMIA: ICD-10-CM

## 2019-06-24 DIAGNOSIS — N90.89 VULVAR LESION: ICD-10-CM

## 2019-06-24 DIAGNOSIS — Z01.419 WELL WOMAN EXAM WITH ROUTINE GYNECOLOGICAL EXAM: ICD-10-CM

## 2019-06-24 DIAGNOSIS — Z01.419 WELL WOMAN EXAM WITH ROUTINE GYNECOLOGICAL EXAM: Primary | ICD-10-CM

## 2019-06-24 DIAGNOSIS — E03.9 ACQUIRED HYPOTHYROIDISM: ICD-10-CM

## 2019-06-24 PROCEDURE — 88142 CYTOPATH C/V THIN LAYER: CPT

## 2019-06-24 PROCEDURE — 87798 DETECT AGENT NOS DNA AMP: CPT

## 2019-06-24 NOTE — PROGRESS NOTES
Patient here for well woman exam.  No concerns with discharge or abd pains. Patient not sexually active at this time. Patient will call [facility] to make appointment. Patient just took her blood pressure medication before leaving for this appointment.

## 2019-06-24 NOTE — PROGRESS NOTES
Subjective:   52 y.o. female for Well Woman Check. She has not had a pap in over 3 years  She is postmenopausal.  Social History: not sexually active. Pertinent past medical hstory: hypertension, no history of HTN, DVT, CAD, DM, liver disease, migraines or smoking. Patient Active Problem List    Diagnosis Date Noted    Hyperlipidemia 06/13/2017    Spondylolisthesis of thoracolumbar region 06/07/2017    Acquired hypothyroidism 06/07/2017    Pre-diabetes 06/07/2017    Chronic bilateral back pain 06/07/2017    Secondary hypertension 06/07/2017    Tobacco abuse 06/07/2017    Colon cancer screening 09/12/2016     Current Outpatient Medications   Medication Sig Dispense Refill    amLODIPine-atorvastatin (CADUET) 5-20 mg per tablet Take 1 Tab by mouth daily. Indications: high cholesterol, high blood pressure 90 Tab 1    levothyroxine (SYNTHROID) 112 mcg tablet Take 1 Tab by mouth Daily (before breakfast). Indications: hypothyroidism 30 Tab 5    levothyroxine (SYNTHROID) 112 mcg tablet Take 1 Tab by mouth Daily (before breakfast). Indications: hypothyroidism 30 Tab 1    naproxen (NAPROSYN) 500 mg tablet Take 1 Tab by mouth two (2) times daily (with meals). 30 Tab 3    Omeprazole delayed release (PRILOSEC D/R) 20 mg tablet Take 1 Tab by mouth daily.  30 Tab 5     Allergies   Allergen Reactions    Tramadol Vertigo     Pt reported blacking out     Past Medical History:   Diagnosis Date    Diabetes ([address]     Diabetes Type II reported by patient    GERD (gastroesophageal reflux disease)     Hx of abnormal cervical Pap smear     s/p hysterectomy     Hypercholesterolemia     Hypertension     Thyroid disease     hypothyroid     Past Surgical History:   Procedure Laterality Date    HX CARPAL TUNNEL RELEASE      right hand    HX GYN      partial hysterectomy    HX PARTIAL HYSTERECTOMY       Family History   Problem Relation Age of Onset    Thyroid Disease Mother     Depression Mother     Hypertension Father     Heart Disease Father     Thyroid Disease Sister     Hypertension Sister     Diabetes Maternal Aunt     Diabetes Sister     Eczema Sister     No Known Problems Brother     Alzheimer Paternal Grandmother     Diabetes Paternal Grandmother      Social History     Tobacco Use    Smoking status: Former Smoker     Packs/day: 1.00     Last attempt to quit: 2019     Years since quittin.1    Smokeless tobacco: Never Used   Substance Use Topics    Alcohol use: Yes     Frequency: Monthly or less     Drinks per session: 3 or 4     Binge frequency: Never     Comment: occasional on holidays        ROS:  Feeling well. No dyspnea or chest pain on exertion. No abdominal pain, change in bowel habits, black or bloody stools. No urinary tract symptoms. GYN ROS: no breast pain or new or enlarging lumps on self exam, no vaginal bleeding, no discharge or pelvic pain, she complains of non painful vulvar lesion. Menopausal symptoms: constipation. No neurological complaints. Last DEXA scan and T-score: none  Last Colonoscopy: given FIT test at last appointment. Last Mammogram: none    Objective:     Visit Vitals  /76 (BP 1 Location: Left arm, BP Patient Position: Sitting) Comment: Manual check   Pulse 73   Temp 97.5 °F (36.4 °C)   Resp 16   Ht 5' 4\" (1.626 m)   Wt 211 lb 9.6 oz (96 kg)   SpO2 99%   BMI 36.32 kg/m²     The patient appears well, alert, oriented x 3, in no distress. ENT normal.  Neck supple. No adenopathy or thyromegaly. HUSEYIN. Lungs are clear, good air entry, no wheezes, rhonchi or rales. S1 and S2 normal, no murmurs, regular rate and rhythm. Abdomen soft without tenderness, guarding, mass or organomegaly. Extremities show no edema, normal peripheral pulses. Neurological is normal, no focal findings. BREAST EXAM: breasts appear normal, no suspicious masses, no skin or nipple changes or axillary nodes    PELVIC EXAM: VULVA: vulvar lesion at 11 oclock position.  Hyperpigmented (dark brown), raised, 0.8 cm, annular with regular borders, non tender. , VAGINA: normal appearing vagina with normal color and discharge, no lesions, CERVIX: surgically absent, UTERUS: surgically absent, vaginal cuff well healed, RECTAL: normal rectal, no masses, guaiac negative stool obtained, external hemorrhoids non-thrombosed, PAP: Pap smear done today, DNA probe for chlamydia and GC obtained, exam chaperoned by Shen Velez LPN    Assessment/Plan:   well woman  postmenopausal  mammogram  pap smear  counseled on breast self exam, mammography screening, menopause and adequate intake of calcium and vitamin D  return annually or prn    ICD-10-CM ICD-9-CM    1. Well woman exam with routine gynecological exam Z01.419 V72.31 NUSWAB VAGINITIS PLUS      PAP, LB, RFX HPV [id number]([id number])      AMB POC FECAL BLOOD, OCCULT, QL 1 CARD   2. Vulvar lesion N90.89 624.8     Patient without insurance. Would like to postpone referral for biopsy. 3. Acquired hypothyroidism E03.9 244.9 TSH 3RD GENERATION      T4, FREE   4. Mixed hyperlipidemia X81.7 873.9 METABOLIC PANEL, COMPREHENSIVE      LIPID PANEL     Diagnoses and all orders for this visit:    1. Well woman exam with routine gynecological exam  -     NUSWAB VAGINITIS PLUS; Future  -     PAP, LB, RFX HPV PNOQP([id number]); Future  -     AMB POC FECAL BLOOD, OCCULT, QL 1 CARD    2. Vulvar lesion  Comments:  Patient without insurance. Would like to postpone referral for biopsy. 3. Acquired hypothyroidism  -     TSH 3RD GENERATION; Future  -     T4, FREE; Future    4. Mixed hyperlipidemia  -     METABOLIC PANEL, COMPREHENSIVE; Future  -     LIPID PANEL; Future      I have discussed the diagnosis with the patient and the intended plan as seen in the above orders. The patient has received an after-visit summary and questions were answered concerning future plans. I have discussed medication side effects and warnings with the patient as well.   Patient agreeable with above plan and verbalizes understanding. Follow-up and Dispositions    · Return in about 3 months (around 9/24/2019), or if symptoms worsen or fail to improve.

## 2019-06-26 ENCOUNTER — HOSPITAL ENCOUNTER (OUTPATIENT)
Dept: LAB | Age: 54
Discharge: HOME | End: 2019-06-26

## 2019-06-26 DIAGNOSIS — Z12.11 COLON CANCER SCREENING: ICD-10-CM

## 2019-06-26 LAB
A VAGINAE DNA VAG QL NAA+PROBE: NORMAL SCORE
BVAB2 DNA VAG QL NAA+PROBE: NORMAL SCORE
C ALBICANS DNA VAG QL NAA+PROBE: NEGATIVE
C GLABRATA DNA VAG QL NAA+PROBE: NEGATIVE
C TRACH RRNA SPEC QL NAA+PROBE: NEGATIVE
MEGA1 DNA VAG QL NAA+PROBE: NORMAL SCORE
N GONORRHOEA RRNA SPEC QL NAA+PROBE: NEGATIVE
SPECIMEN SOURCE: NORMAL
T VAGINALIS RRNA SPEC QL NAA+PROBE: NEGATIVE

## 2019-06-26 PROCEDURE — 82274 ASSAY TEST FOR BLOOD FECAL: CPT

## 2019-06-28 LAB — HEMOCCULT STL QL IA: NEGATIVE

## 2019-07-15 ENCOUNTER — OFFICE VISIT (OUTPATIENT)
Dept: FAMILY MEDICINE CLINIC | Age: 54
End: 2019-07-15

## 2019-07-15 VITALS
RESPIRATION RATE: 16 BRPM | SYSTOLIC BLOOD PRESSURE: 125 MMHG | OXYGEN SATURATION: 98 % | HEART RATE: 70 BPM | DIASTOLIC BLOOD PRESSURE: 73 MMHG

## 2019-07-15 DIAGNOSIS — R03.1 BLOOD PRESSURE LOWER THAN PRIOR MEASUREMENT: ICD-10-CM

## 2019-07-15 DIAGNOSIS — Z01.30 BLOOD PRESSURE CHECK: Primary | ICD-10-CM

## 2019-07-15 NOTE — PROGRESS NOTES
Subjective:   Danita Greene is a 52 y.o. female with hypertension. Current Outpatient Medications   Medication Sig Dispense Refill    amLODIPine-atorvastatin (CADUET) 5-20 mg per tablet Take 1 Tab by mouth daily. Indications: high cholesterol, high blood pressure 90 Tab 1    levothyroxine (SYNTHROID) 112 mcg tablet Take 1 Tab by mouth Daily (before breakfast). Indications: hypothyroidism 30 Tab 5    levothyroxine (SYNTHROID) 112 mcg tablet Take 1 Tab by mouth Daily (before breakfast). Indications: hypothyroidism 30 Tab 1    naproxen (NAPROSYN) 500 mg tablet Take 1 Tab by mouth two (2) times daily (with meals). 30 Tab 3    Omeprazole delayed release (PRILOSEC D/R) 20 mg tablet Take 1 Tab by mouth daily. 30 Tab 5      Hypertension ROS: taking medications as instructed, no medication side effects noted, no TIA's, no chest pain on exertion, no dyspnea on exertion, no swelling of ankles. New concerns: none. Objective:   Blood pressure today 125/73  Appearance alert, well appearing, and in no distress and oriented to person, place, and time. General exam BP noted to be well controlled today in office. Assessment:    Hypertension well controlled, stable, improved. Plan:   current treatment plan is effective, no change in therapy.

## 2019-07-25 ENCOUNTER — TELEPHONE (OUTPATIENT)
Dept: FAMILY MEDICINE CLINIC | Age: 54
End: 2019-07-25

## 2019-07-29 ENCOUNTER — TELEPHONE (OUTPATIENT)
Dept: FAMILY MEDICINE CLINIC | Age: 54
End: 2019-07-29

## 2019-07-31 NOTE — TELEPHONE ENCOUNTER
Medication: Caduet 5/20 mg, dose: 1 tab, how often: daily, current number of medication days provided: 90, refill per application. Lot #:   [id number], EXP 07/2021. This medication was received and verified for the following 1. Correct Patient, 2. Correct Diagnosis, 3. Correct Drug, 4. Correct route, and no current allergy to medication. Medication: Synthroid 112 mcg, dose: 1 tab, how often: qd, current number of medication days provided: 90, refill per application. Lot #: [id number], EXP 05/2020. This medication was received and verified for the following 1. Correct Patient, 2. Correct Diagnosis, 3. Correct Drug, 4. Correct route, and no current allergy to medication.

## 2019-09-12 ENCOUNTER — HOSPITAL ENCOUNTER (OUTPATIENT)
Dept: LAB | Age: 54
Discharge: HOME | End: 2019-09-12

## 2019-09-12 ENCOUNTER — LAB ONLY (OUTPATIENT)
Dept: FAMILY MEDICINE CLINIC | Age: 54
End: 2019-09-12

## 2019-09-12 DIAGNOSIS — E03.9 ACQUIRED HYPOTHYROIDISM: ICD-10-CM

## 2019-09-12 DIAGNOSIS — E78.2 MIXED HYPERLIPIDEMIA: ICD-10-CM

## 2019-09-12 LAB
ALBUMIN SERPL-MCNC: 3.8 G/DL (ref 3.4–5)
ALBUMIN/GLOB SERPL: 1.2 {RATIO} (ref 0.8–1.7)
ALP SERPL-CCNC: 49 U/L (ref 45–117)
ALT SERPL-CCNC: 22 U/L (ref 13–56)
ANION GAP SERPL CALC-SCNC: 3 MMOL/L (ref 3–18)
AST SERPL-CCNC: 15 U/L (ref 10–38)
BILIRUB SERPL-MCNC: 0.5 MG/DL (ref 0.2–1)
BUN SERPL-MCNC: 16 MG/DL (ref 7–18)
BUN/CREAT SERPL: 18 (ref 12–20)
CALCIUM SERPL-MCNC: 8.7 MG/DL (ref 8.5–10.1)
CHLORIDE SERPL-SCNC: 108 MMOL/L (ref 100–111)
CHOLEST SERPL-MCNC: 178 MG/DL
CO2 SERPL-SCNC: 32 MMOL/L (ref 21–32)
CREAT SERPL-MCNC: 0.87 MG/DL (ref 0.6–1.3)
GLOBULIN SER CALC-MCNC: 3.1 G/DL (ref 2–4)
GLUCOSE SERPL-MCNC: 96 MG/DL (ref 74–99)
HDLC SERPL-MCNC: 55 MG/DL (ref 40–60)
HDLC SERPL: 3.2 {RATIO} (ref 0–5)
LDLC SERPL CALC-MCNC: 97.2 MG/DL (ref 0–100)
LIPID PROFILE,FLP: NORMAL
POTASSIUM SERPL-SCNC: 4 MMOL/L (ref 3.5–5.5)
PROT SERPL-MCNC: 6.9 G/DL (ref 6.4–8.2)
SODIUM SERPL-SCNC: 143 MMOL/L (ref 136–145)
T4 FREE SERPL-MCNC: 1 NG/DL (ref 0.7–1.5)
TRIGL SERPL-MCNC: 129 MG/DL (ref ?–150)
TSH SERPL DL<=0.05 MIU/L-ACNC: 18.6 UIU/ML (ref 0.36–3.74)
VLDLC SERPL CALC-MCNC: 25.8 MG/DL

## 2019-09-12 PROCEDURE — 80053 COMPREHEN METABOLIC PANEL: CPT

## 2019-09-12 PROCEDURE — 84439 ASSAY OF FREE THYROXINE: CPT

## 2019-09-12 PROCEDURE — 80061 LIPID PANEL: CPT

## 2019-09-12 PROCEDURE — 84443 ASSAY THYROID STIM HORMONE: CPT

## 2019-09-23 ENCOUNTER — OFFICE VISIT (OUTPATIENT)
Dept: FAMILY MEDICINE CLINIC | Age: 54
End: 2019-09-23

## 2019-09-23 VITALS
OXYGEN SATURATION: 98 % | WEIGHT: 219.8 LBS | HEIGHT: 64 IN | SYSTOLIC BLOOD PRESSURE: 144 MMHG | BODY MASS INDEX: 37.52 KG/M2 | HEART RATE: 83 BPM | RESPIRATION RATE: 20 BRPM | TEMPERATURE: 98.4 F | DIASTOLIC BLOOD PRESSURE: 79 MMHG

## 2019-09-23 DIAGNOSIS — I15.9 SECONDARY HYPERTENSION: ICD-10-CM

## 2019-09-23 DIAGNOSIS — Z12.39 BREAST CANCER SCREENING: ICD-10-CM

## 2019-09-23 DIAGNOSIS — I10 ESSENTIAL HYPERTENSION: ICD-10-CM

## 2019-09-23 DIAGNOSIS — E03.9 ACQUIRED HYPOTHYROIDISM: Primary | ICD-10-CM

## 2019-09-23 DIAGNOSIS — E66.01 SEVERE OBESITY (HCC): ICD-10-CM

## 2019-09-23 RX ORDER — LEVOTHYROXINE SODIUM 150 UG/1
150 TABLET ORAL
Qty: 30 TAB | Refills: 4 | Status: ON HOLD | OUTPATIENT
Start: 2019-09-23 | End: 2019-11-27 | Stop reason: SDUPTHER

## 2019-09-23 NOTE — PROGRESS NOTES
09/23/19    PCP: Azalea Rodgers NP    Chief Complaint   Patient presents with    Follow Up Chronic Condition        HISTORY OF PRESENT ILLNESS  Mavis Farooq  is a 62 y.o. female whom presents for Follow Up Chronic Condition    HPI  Thyroid Review:  Patient is seen for followup of hypothyroidism. Thyroid ROS: fatigue, weight gain, feeling slow and sweating. Cardiovascular Review:  The patient has hypertension. Diet and Lifestyle: not attempting to follow a low fat, low cholesterol diet  Home BP Monitoring: is not measured at home. Pertinent ROS: taking medications as instructed, no medication side effects noted, no TIA's, no chest pain on exertion, no dyspnea on exertion, no swelling of ankles. Patient Active Problem List    Diagnosis Date Noted    Severe obesity ([facility] [address] 09/23/2019    Hyperlipidemia 06/13/2017    Spondylolisthesis of thoracolumbar region 06/07/2017    Acquired hypothyroidism 06/07/2017    Pre-diabetes 06/07/2017    Chronic bilateral back pain 06/07/2017    Secondary hypertension 06/07/2017    Tobacco abuse 06/07/2017    Colon cancer screening 09/12/2016     Current Outpatient Medications   Medication Sig Dispense Refill    levothyroxine (SYNTHROID) 150 mcg tablet Take 1 Tab by mouth Daily (before breakfast). 30 Tab 4    amLODIPine-atorvastatin (CADUET) 5-20 mg per tablet Take 1 Tab by mouth daily. Indications: high cholesterol, high blood pressure 90 Tab 1    Omeprazole delayed release (PRILOSEC D/R) 20 mg tablet Take 1 Tab by mouth daily. 30 Tab 5    naproxen (NAPROSYN) 500 mg tablet Take 1 Tab by mouth two (2) times daily (with meals).  30 Tab 3     Allergies   Allergen Reactions    Tramadol Vertigo     Pt reported blacking out     Past Medical History:   Diagnosis Date    Diabetes ([facility] [address] [address]     Diabetes Type II reported by patient    GERD (gastroesophageal reflux disease)     Hx of abnormal cervical Pap smear     s/p hysterectomy     Hypercholesterolemia     Hypertension     Thyroid disease     hypothyroid     Past Surgical History:   Procedure Laterality Date    HX CARPAL TUNNEL RELEASE      right hand    HX GYN      partial hysterectomy    HX PARTIAL HYSTERECTOMY       Family History   Problem Relation Age of Onset    Thyroid Disease Mother     Depression Mother     Hypertension Father     Heart Disease Father     Thyroid Disease Sister     Hypertension Sister     Diabetes Maternal Aunt     Diabetes Sister     Eczema Sister     No Known Problems Brother     Alzheimer Paternal Grandmother     Diabetes Paternal Grandmother      Social History     Tobacco Use    Smoking status: Former Smoker     Packs/day: 1.00     Last attempt to quit: 2019     Years since quittin.3    Smokeless tobacco: Never Used   Substance Use Topics    Alcohol use: Yes     Frequency: Monthly or less     Drinks per session: 3 or 4     Binge frequency: Never     Comment: occasional on holidays       Review of Systems   Constitutional: Positive for malaise/fatigue. Negative for chills, diaphoresis, fever and weight loss. Reports hot flashes and crying with no clear reason. Respiratory: Negative. Negative for sputum production. Cardiovascular: Negative. Negative for chest pain and palpitations. Psychiatric/Behavioral:        Crying intermittently        Visit Vitals  /79   Pulse 83   Temp 98.4 °F (36.9 °C)   Resp 20   Ht 5' 4\" (1.626 m)   Wt 219 lb 12.8 oz (99.7 kg)   SpO2 98%   BMI 37.73 kg/m²       Pain Scale: 0 - No pain/10    Pain Location:      Physical Exam   Constitutional: She is oriented to person, place, and time and well-developed, well-nourished, and in no distress. HENT:   Head: Normocephalic. Eyes: Pupils are equal, round, and reactive to light. Neck: Normal range of motion. Neck supple. Cardiovascular: Normal rate, regular rhythm and normal heart sounds. Pulmonary/Chest: Effort normal and breath sounds normal.   Abdominal: Soft. Bowel sounds are normal.   Neurological: She is alert and oriented to person, place, and time. Skin: Skin is warm and dry. Psychiatric: Memory, affect and judgment normal. She exhibits a depressed mood (Tearful not precipitated by an event ). Vitals reviewed. Lab Results   Component Value Date/Time    WBC 7.8 06/13/2019 09:54 AM    HGB 14.5 06/13/2019 09:54 AM    HCT 44.8 06/13/2019 09:54 AM    PLATELET 935 95/66/6225 09:54 AM    MCV 93.7 06/13/2019 09:54 AM     Lab Results   Component Value Date/Time    Hemoglobin A1c 5.6 06/13/2019 09:54 AM    Hemoglobin A1c 5.9 (H) 06/07/2017 10:28 AM    Hemoglobin A1c 5.8 (H) 09/12/2016 11:43 AM    Glucose 96 09/12/2019 07:25 AM    Glucose POC 95 06/13/2019 08:15 AM    LDL, calculated 97.2 09/12/2019 07:25 AM    Creatinine 0.87 09/12/2019 07:25 AM      Lab Results   Component Value Date/Time    Cholesterol, total 178 09/12/2019 07:25 AM    HDL Cholesterol 55 09/12/2019 07:25 AM    LDL, calculated 97.2 09/12/2019 07:25 AM    Triglyceride 129 09/12/2019 07:25 AM    CHOL/HDL Ratio 3.2 09/12/2019 07:25 AM     Lab Results   Component Value Date/Time    TSH 18.60 (H) 09/12/2019 07:25 AM    T4, Free 1.0 09/12/2019 07:25 AM       Pertinent Radiology reports:     ASSESSMENT and PLAN    ICD-10-CM ICD-9-CM    1. Acquired hypothyroidism E03.9 244.9 levothyroxine (SYNTHROID) 150 mcg tablet      TSH 3RD GENERATION      T4, FREE      METABOLIC PANEL, COMPREHENSIVE      LIPID PANEL   2. Severe obesity ([facility]) E66.01 278.01 TSH 3RD GENERATION      T4, FREE      METABOLIC PANEL, COMPREHENSIVE      LIPID PANEL   3. Breast cancer screening Z12.31 Q29.18 REFERRAL TO PUBLIC HEALTH   4. Secondary hypertension I15.9 405.99    5. Essential hypertension I10 401.9     Patient stable on medications. lab results and schedule of future lab studies reviewed with patient   Patient to increase synthroid. Discussed menopausal symptoms. Patient declined any medication treatments.  Recommended otc Estroven as alternative. Medications Discontinued During This Encounter   Medication Reason    levothyroxine (SYNTHROID) 112 mcg tablet Dose Adjustment    levothyroxine (SYNTHROID) 112 mcg tablet Dose Adjustment       Written instructions followed our verbal discussion of all information discussed above, pending tests ordered and future goals/plans. Patient expressed understanding of current diagnosis, planned testing, follow up and if needed to contact the office for any questions or concerns prior to the next visit. Follow-up and Dispositions    · Return in about 3 months (around 12/23/2019), or if symptoms worsen or fail to improve.

## 2019-09-23 NOTE — PATIENT INSTRUCTIONS
Hypothyroidism: Care Instructions  Your Care Instructions    You have hypothyroidism, which means that your body is not making enough thyroid hormone. This hormone helps your body use energy. If your thyroid level is low, you may feel tired, be constipated, have an increase in your blood pressure, or have dry skin or memory problems. You may also get cold easily, even when it is warm. Women with low thyroid levels may have heavy menstrual periods. A blood test to find your thyroid-stimulating hormone (TSH) level is used to check for hypothyroidism. A high TSH level may mean that you have low thyroid. When your body is not making enough thyroid hormone, TSH levels rise in an effort to make the body produce more. The treatment for hypothyroidism is to take thyroid hormone pills. You should start to feel better in 1 to 2 weeks. But it can take several months to see changes in the TSH level. You will need regular visits with your doctor to make sure you have the right dose of medicine. Most people need treatment for the rest of their lives. You will need to see your doctor regularly to have blood tests and to make sure you are doing well. Follow-up care is a key part of your treatment and safety. Be sure to make and go to all appointments, and call your doctor if you are having problems. It's also a good idea to know your test results and keep a list of the medicines you take. How can you care for yourself at home? · Take your thyroid hormone medicine exactly as prescribed. Call your doctor if you think you are having a problem with your medicine. Most people do not have side effects if they take the right amount of medicine regularly. ? Take the medicine 30 minutes before breakfast, and do not take it with calcium, vitamins, or iron. ? Do not take extra doses of your thyroid medicine. It will not help you get better any faster, and it may cause side effects.   ? If you forget to take a dose, do NOT take a double dose of medicine. Take your usual dose the next day. · Tell your doctor about all prescription, herbal, or over-the-counter products you take. · Take care of yourself. Eat a healthy diet, get enough sleep, and get regular exercise. When should you call for help? Call 911 anytime you think you may need emergency care. For example, call if:    · You passed out (lost consciousness).     · You have severe trouble breathing.     · You have a very slow heartbeat (less than 60 beats a minute).     · You have a low body temperature (95°F or below).    Call your doctor now or seek immediate medical care if:    · You feel tired, sluggish, or weak.     · You have trouble remembering things or concentrating.     · You do not begin to feel better 2 weeks after starting your medicine.    Watch closely for changes in your health, and be sure to contact your doctor if you have any problems. Where can you learn more? Go to http://ralph-paige.info/. Enter K232 in the search box to learn more about \"Hypothyroidism: Care Instructions. \"  Current as of: November 6, 2018  Content Version: 12.2  © 5703-2635 [employer]Ã¢â€žÂ¢. Care instructions adapted under license by [employer] (which disclaims liability or warranty for this information). If you have questions about a medical condition or this instruction, always ask your healthcare professional. [place] [address] any warranty or liability for your use of this information. Learning About Menopause  What is menopause? For most women, menopause is a natural process of aging. Menstrual periods gradually stop. The ability to become pregnant ends. Some women feel relief that their childbearing years are ending. But other women struggle with the physical and emotional changes that come with menopause. For most women, menopause happens around age 48. But every woman's body has its own timeline.  Some women stop having periods in their mid-40s. Others keep having periods well into their 50s. And some women go through menopause early because of cancer treatment or surgery to remove the ovaries. What can you expect with menopause? · It starts with perimenopause. This is the process of change that leads up to menopause. Perimenopause can start as early as your late 35s or as late as your early 46s. How long it lasts varies. But it usually lasts from 2 to 8 years. · During this time, your hormone levels will go up and down unevenly (fluctuate). This causes changes in your periods and other symptoms. In time, estrogen and progesterone levels drop enough that the menstrual cycle stops. Going a full year without having a period is usually considered menopause. · Low estrogen levels after menopause speed bone loss. This increases your risk of osteoporosis. Also, your risk of heart disease increases after menopause. · It's normal to have thinner, dryer, wrinkled skin after menopause. The vaginal lining and the lower urinary tract also thin and weaken. This can make it hard to have sex. It can also increase the risk of vaginal and urinary tract infections. What are the symptoms? · Lighter or heavier periods. Your menstrual cycle may be longer or shorter. You may skip periods. · Hot flashes. You may have a sudden feeling of intense body heat. You may sweat, and your head, neck, and chest may get red. Along with hot flashes, you may have a heartbeat that's too fast or not regular. You may also feel anxious or grouchy. In rare cases you might feel panic. · Trouble sleeping. · Mood swings or feeling grouchy, depressed, or worried. · Problems with remembering or thinking clearly. · Vaginal dryness. Some women have only a few mild symptoms. Others have severe symptoms that disrupt their sleep and daily lives. Symptoms tend to last or get worse the first year or more after menopause. Over time, hormones even out at low levels. Many symptoms improve or go away. But some women may have symptoms that don't go away. How are menopause symptoms treated?   If you have mild symptoms, you may get some relief if you eat healthy foods, exercise, and lower your stress. Some women choose to take medicines if they have severe symptoms that aren't helped by making changes to their lifestyle.   Lifestyle changes    · Choose a heart-healthy diet that is low in saturated fat. It should include plenty of fruits, vegetables, beans, and high-fiber grains and breads. Be sure you get enough calcium and vitamin D to help your bones stay strong. Low-fat or nonfat dairy products are a great source of calcium.     · Get regular exercise. Exercise can help you manage your weight, keep your heart and bones strong, and lift your mood.     · Limit caffeine, alcohol, and stress. These things may make symptoms worse. Limiting them may help you sleep better.     · If you smoke, stop. Quitting smoking can reduce hot flashes and long-term health risks. Medicines   If your symptoms are severe, talk with your doctor. You may want to try prescription medicines, such as:    · Low-dose birth control pills before menopause.     · Low-dose hormone therapy (HT) after menopause.     · Antidepressants.     · A medicine called clonidine (Catapres) that is usually used to treat high blood pressure.    All medicines for menopause symptoms have possible risks or side effects. A very small number of women develop serious health problems when taking hormone therapy. Be sure to talk to your doctor about your possible health risks before you start a treatment for menopause symptoms.   Other treatments   You can try:    · Breathing exercises. They may help reduce hot flashes and emotional symptoms.     · Soy. Some women feel that eating lots of soy helps even out their menopause symptoms.     · Yoga or biofeedback. They may help reduce stress.    Follow-up care is a key part of your treatment and safety. Be sure to make and go to all appointments, and call your doctor if you are having problems. It's also a good idea to know your test results and keep a list of the medicines you take. Where can you learn more? Go to http://ralph-paige.info/. Enter H199 in the search box to learn more about \"Learning About Menopause. \"  Current as of: February 19, 2019  Content Version: 12.2  © 2558-5651 [employer]. Care instructions adapted under license by [employer] (which disclaims liability or warranty for this information). If you have questions about a medical condition or this instruction, always ask your healthcare professional. [address] any warranty or liability for your use of this information.

## 2019-10-10 ENCOUNTER — TELEPHONE (OUTPATIENT)
Dept: FAMILY MEDICINE CLINIC | Age: 54
End: 2019-10-10

## 2019-10-16 ENCOUNTER — TELEPHONE (OUTPATIENT)
Dept: FAMILY MEDICINE CLINIC | Age: 54
End: 2019-10-16

## 2019-10-16 NOTE — TELEPHONE ENCOUNTER
PLEASE VERIFY THIS IS THE CORRECT MEDICATION FOR THIS PATIENT See other encounter with resuts from Dr Jaky Marcos

## 2019-10-17 NOTE — TELEPHONE ENCOUNTER
Medication: Caduet 5/20 mg , dose: 1 tab, how often: daily, current number of medication days provided: 90, refill per application. Lot #:  # [id number], EXP 07/2021  This medication was received and verified for the following 1. Correct Patient, 2. Correct Diagnosis, 3. Correct Drug, 4. Correct route, and no current allergy to medication.

## 2019-11-25 ENCOUNTER — OFFICE VISIT (OUTPATIENT)
Dept: FAMILY MEDICINE CLINIC | Age: 54
End: 2019-11-25

## 2019-11-25 VITALS
RESPIRATION RATE: 20 BRPM | WEIGHT: 220.4 LBS | BODY MASS INDEX: 37.63 KG/M2 | HEIGHT: 64 IN | SYSTOLIC BLOOD PRESSURE: 163 MMHG | OXYGEN SATURATION: 99 % | DIASTOLIC BLOOD PRESSURE: 71 MMHG | TEMPERATURE: 97.6 F | HEART RATE: 76 BPM

## 2019-11-25 DIAGNOSIS — R06.2 WHEEZING: ICD-10-CM

## 2019-11-25 DIAGNOSIS — K08.89 DENTALGIA: Primary | ICD-10-CM

## 2019-11-25 RX ORDER — ALBUTEROL SULFATE 90 UG/1
1-2 AEROSOL, METERED RESPIRATORY (INHALATION)
Qty: 1 INHALER | Refills: 0 | Status: SHIPPED | COMMUNITY
Start: 2019-11-25 | End: 2019-11-27

## 2019-11-25 RX ORDER — IBUPROFEN 800 MG/1
800 TABLET ORAL
Qty: 21 TAB | Refills: 0 | Status: ON HOLD | OUTPATIENT
Start: 2019-11-25 | End: 2019-11-26

## 2019-11-25 RX ORDER — AMOXICILLIN 500 MG/1
500 CAPSULE ORAL 2 TIMES DAILY
Qty: 20 CAP | Refills: 0 | Status: ON HOLD | OUTPATIENT
Start: 2019-11-25 | End: 2019-11-26

## 2019-11-25 NOTE — PATIENT INSTRUCTIONS
Dental Pain: After Your Visit Your Care Instructions The most common cause of dental pain is tooth decay. It can also be caused by an infection of the tooth (abscess) or gum, a tooth that has not broken all the way through the gum (impacted tooth), or a problem with the nerve-filled center of the tooth. Follow-up care is a key part of your treatment and safety. Be sure to make and go to all appointments, and call your doctor if you are having problems. Its also a good idea to know your test results and keep a list of the medicines you take. How can you care for yourself at home? · Contact a dentist for follow-up care. · Put ice or a cold pack on the outside of your mouth for 10 to 20 minutes at a time to reduce pain and swelling. Put a thin cloth between the ice and your skin. · Take an over-the-counter pain medicine, such as acetaminophen (Tylenol), ibuprofen (Advil, Motrin), or naproxen (Aleve). Read and follow all instructions on the label. · Do not take two or more pain medicines at the same time unless the doctor told you to. Many pain medicines have acetaminophen, which is Tylenol. Too much acetaminophen (Tylenol) can be harmful. · Rinse your mouth with warm salt water every 2 hours to help relieve pain and swelling from an infected tooth. Mix 1 teaspoon of salt in 8 ounces of water. · If your doctor prescribed antibiotics, take them as directed. Do not stop taking them just because you feel better. You need to take the full course of antibiotics. When should you call for help? Call your doctor now or seek immediate medical care if: 
· You have signs of infection, such as: 
¨ Increased pain, swelling, warmth, or redness. ¨ Pus draining from the gum, tooth, or face. ¨ A fever. Watch closely for changes in your health, and be sure to contact your doctor if: 
· You do not get better as expected. Where can you learn more? Go to [employer] Enter V264 in the search box to learn more about \"Dental Pain: After Your Visit. \"  
© 9697-7863 Healthwise, [employer]. Care instructions adapted under license by [facility] (which disclaims liability or warranty for this information). This care instruction is for use with your licensed healthcare professional. If you have questions about a medical condition or this instruction, always ask your healthcare professional. [place] [address] any warranty or liability for your use of this information. Content Version: 33.7.644232; Last Revised: May 17, 2013

## 2019-11-25 NOTE — PROGRESS NOTES
Patient concern with pain in lower mouth. Patient says the pain started last night while at work. Patient says she was not eating or chewing anything. Patient says it hurt worse when she walk. Patient says she tried Ibuprofen that helped.

## 2019-11-25 NOTE — LETTER
11/25/2019 [facility] [address], [address] Ron Figueroa, 1965, is picking up the following medications ordered from the [facility]: STOCK:  VENTOLIN HFA #1 April Loach Patient's Signature: _____________________________ Today's Date: 11/25/2019

## 2019-11-25 NOTE — PROGRESS NOTES
Jazmín Nascimento is a 62 y.o. female who presents today with dental pain. HPI  Patient who presents with complaint of toothache. Onset of symptoms was abrupt. Patient describes pain as aching and throbbing. Pain severity at onset was intense and now is moderate. The pain today is 4/10. Patient denies jaw swelling or fever >101. Pain is aggravated by movement and use. Pain is alleviated by rest and NSAIDS. The patient denies other complaints. Patient has not sought treatment by another care provider for this problem. Care prior to arrival consisted of NSAID, with minimal, moderate relief. She reports she has not had routine dental care in years. Patient also complains of intermittent wheezing aggravated by activity and relieved with rest. Symptoms include intermittent SOB. She does report a history of smoking 1 ppd for 44 years, recently quitting 6 months ago. Review of Systems   Constitutional: Negative. HENT: Negative for congestion, ear discharge, ear pain, hearing loss, nosebleeds, sinus pain, sore throat and tinnitus. Lower dental pain. Respiratory: Positive for wheezing. Negative for cough, hemoptysis, sputum production, shortness of breath and stridor. Cardiovascular: Negative. Objective  Physical Exam  Vitals signs reviewed. HENT:      Head: Normocephalic. Jaw: Tenderness present. No swelling, pain on movement or malocclusion. Mouth/Throat:      Lips: Pink. Mouth: Mucous membranes are moist.      Dentition: Abnormal dentition. Dental tenderness and dental caries present. No gingival swelling or dental abscesses. Tongue: No lesions. Palate: No mass. Pharynx: Oropharynx is clear. Uvula midline. No oropharyngeal exudate. Tonsils: No tonsillar exudate. Cardiovascular:      Rate and Rhythm: Normal rate and regular rhythm. Pulses: Normal pulses.    Pulmonary:      Effort: Pulmonary effort is normal.      Breath sounds: Normal breath sounds. Assessment & Plan    ICD-10-CM ICD-9-CM    1. Dentalgia K08.89 525.9 ibuprofen (MOTRIN) 800 mg tablet      amoxicillin (AMOXIL) 500 mg capsule   2. Wheezing R06.2 786.07 albuterol (PROVENTIL HFA, VENTOLIN HFA, PROAIR HFA) 90 mcg/actuation inhaler     Diagnoses and all orders for this visit:    1. Dentalgia  -     ibuprofen (MOTRIN) 800 mg tablet; Take 1 Tab by mouth every six (6) hours as needed for Pain for up to 7 days. -     amoxicillin (AMOXIL) 500 mg capsule; Take 1 Cap by mouth two (2) times a day for 10 days. 2. Wheezing  -     albuterol (PROVENTIL HFA, VENTOLIN HFA, PROAIR HFA) 90 mcg/actuation inhaler; Take 1-2 Puffs by inhalation every four (4) hours as needed for Wheezing. Referred to local dentistry for routine/preventative treatment  Discussed oral hygiene practices  I have discussed the diagnosis with the patient and the intended plan as seen in the above orders. The patient has received an after-visit summary and questions were answered concerning future plans. I have discussed medication side effects and warnings with the patient as well. Patient agreeable with above plan and verbalizes understanding.         Germain Phoenix NP

## 2019-11-26 ENCOUNTER — HOSPITAL ENCOUNTER (INPATIENT)
Age: 54
LOS: 1 days | Discharge: HOME | DRG: 247 | End: 2019-11-27
Attending: EMERGENCY MEDICINE | Admitting: INTERNAL MEDICINE
Payer: SUBSIDIZED

## 2019-11-26 ENCOUNTER — APPOINTMENT (OUTPATIENT)
Dept: VASCULAR SURGERY | Age: 54
DRG: 247 | End: 2019-11-26
Attending: INTERNAL MEDICINE
Payer: SUBSIDIZED

## 2019-11-26 ENCOUNTER — APPOINTMENT (OUTPATIENT)
Dept: NON INVASIVE DIAGNOSTICS | Age: 54
DRG: 247 | End: 2019-11-26
Attending: INTERNAL MEDICINE
Payer: SUBSIDIZED

## 2019-11-26 ENCOUNTER — APPOINTMENT (OUTPATIENT)
Dept: GENERAL RADIOLOGY | Age: 54
DRG: 247 | End: 2019-11-26
Attending: EMERGENCY MEDICINE
Payer: SUBSIDIZED

## 2019-11-26 DIAGNOSIS — E03.9 ACQUIRED HYPOTHYROIDISM: ICD-10-CM

## 2019-11-26 DIAGNOSIS — I21.19 ACUTE ST ELEVATION MYOCARDIAL INFARCTION (STEMI) INVOLVING OTHER CORONARY ARTERY OF INFERIOR WALL (HCC): Primary | ICD-10-CM

## 2019-11-26 DIAGNOSIS — I21.3 ST ELEVATION (STEMI) MYOCARDIAL INFARCTION (HCC): ICD-10-CM

## 2019-11-26 PROBLEM — T14.8XXA HEMATOMA: Status: ACTIVE | Noted: 2019-11-26

## 2019-11-26 LAB
ACT BLD: 158 SECS (ref 79–138)
ACT BLD: 169 SECS (ref 79–138)
ACT BLD: 230 SECS (ref 79–138)
ALBUMIN SERPL-MCNC: 3.9 G/DL (ref 3.4–5)
ALBUMIN/GLOB SERPL: 0.9 {RATIO} (ref 0.8–1.7)
ALP SERPL-CCNC: 60 U/L (ref 45–117)
ALT SERPL-CCNC: 25 U/L (ref 13–56)
ANION GAP BLD CALC-SCNC: 14 MMOL/L (ref 10–20)
ANION GAP SERPL CALC-SCNC: 6 MMOL/L (ref 3–18)
APTT PPP: 23.9 SEC (ref 23–36.4)
AST SERPL-CCNC: 48 U/L (ref 10–38)
ATRIAL RATE: 79 BPM
ATRIAL RATE: 81 BPM
BASOPHILS # BLD: 0 K/UL (ref 0–0.1)
BASOPHILS NFR BLD: 0 % (ref 0–2)
BILIRUB SERPL-MCNC: 0.5 MG/DL (ref 0.2–1)
BUN BLD-MCNC: 16 MG/DL (ref 7–18)
BUN SERPL-MCNC: 15 MG/DL (ref 7–18)
BUN/CREAT SERPL: 17 (ref 12–20)
CA-I BLD-MCNC: 1.15 MMOL/L (ref 1.12–1.32)
CALCIUM SERPL-MCNC: 8.7 MG/DL (ref 8.5–10.1)
CALCULATED P AXIS, ECG09: 69 DEGREES
CALCULATED P AXIS, ECG09: 70 DEGREES
CALCULATED R AXIS, ECG10: 52 DEGREES
CALCULATED R AXIS, ECG10: 54 DEGREES
CALCULATED T AXIS, ECG11: 47 DEGREES
CALCULATED T AXIS, ECG11: 73 DEGREES
CHLORIDE BLD-SCNC: 107 MMOL/L (ref 100–108)
CHLORIDE SERPL-SCNC: 107 MMOL/L (ref 100–111)
CK MB CFR SERPL CALC: 6.2 % (ref 0–4)
CK MB SERPL-MCNC: 16.1 NG/ML (ref 5–25)
CK SERPL-CCNC: 258 U/L (ref 26–192)
CO2 BLD-SCNC: 26 MMOL/L (ref 19–24)
CO2 SERPL-SCNC: 29 MMOL/L (ref 21–32)
CREAT SERPL-MCNC: 0.89 MG/DL (ref 0.6–1.3)
CREAT UR-MCNC: 0.8 MG/DL (ref 0.6–1.3)
DIAGNOSIS, 93000: NORMAL
DIAGNOSIS, 93000: NORMAL
DIFFERENTIAL METHOD BLD: ABNORMAL
ECHO AO ROOT DIAM: 3.02 CM
ECHO LA AREA 4C: 15.2 CM2
ECHO LA VOL 2C: 55.91 ML (ref 22–52)
ECHO LA VOL 4C: 33.95 ML (ref 22–52)
ECHO LA VOL BP: 50.18 ML (ref 22–52)
ECHO LA VOL/BSA BIPLANE: 24.64 ML/M2 (ref 16–28)
ECHO LA VOLUME INDEX A2C: 27.45 ML/M2 (ref 16–28)
ECHO LA VOLUME INDEX A4C: 16.67 ML/M2 (ref 16–28)
ECHO LV EDV TEICHHOLZ: 0.6 ML
ECHO LV ESV TEICHHOLZ: 0.17 ML
ECHO LV INTERNAL DIMENSION DIASTOLIC: 4.61 CM (ref 3.9–5.3)
ECHO LV INTERNAL DIMENSION SYSTOLIC: 2.7 CM
ECHO LV IVSD: 0.95 CM (ref 0.6–0.9)
ECHO LV MASS 2D: 183.3 G (ref 67–162)
ECHO LV MASS INDEX 2D: 90 G/M2 (ref 43–95)
ECHO LV POSTERIOR WALL DIASTOLIC: 1.03 CM (ref 0.6–0.9)
ECHO LVOT DIAM: 1.67 CM
ECHO LVOT PEAK GRADIENT: 3 MMHG
ECHO LVOT PEAK VELOCITY: 86.65 CM/S
ECHO LVOT VTI: 18.68 CM
ECHO MV A VELOCITY: 78.58 CM/S
ECHO MV E DECELERATION TIME (DT): 230.9 MS
ECHO MV E VELOCITY: 68.01 CM/S
ECHO MV E/A RATIO: 0.87
ECHO PVEIN A DURATION: 113 MS
ECHO PVEIN A VELOCITY: 20.98 CM/S
END DIASTOLIC PRESSURE: 18
EOSINOPHIL # BLD: 0.3 K/UL (ref 0–0.4)
EOSINOPHIL NFR BLD: 4 % (ref 0–5)
ERYTHROCYTE [DISTWIDTH] IN BLOOD BY AUTOMATED COUNT: 13.3 % (ref 11.6–14.5)
EST. AVERAGE GLUCOSE BLD GHB EST-MCNC: 123 MG/DL
GLOBULIN SER CALC-MCNC: 4.3 G/DL (ref 2–4)
GLUCOSE BLD STRIP.AUTO-MCNC: 110 MG/DL (ref 70–110)
GLUCOSE BLD STRIP.AUTO-MCNC: 114 MG/DL (ref 70–110)
GLUCOSE BLD STRIP.AUTO-MCNC: 117 MG/DL (ref 74–106)
GLUCOSE BLD STRIP.AUTO-MCNC: 130 MG/DL (ref 70–110)
GLUCOSE BLD STRIP.AUTO-MCNC: 132 MG/DL (ref 70–110)
GLUCOSE SERPL-MCNC: 110 MG/DL (ref 74–99)
HBA1C MFR BLD: 5.9 % (ref 4.2–5.6)
HCT VFR BLD AUTO: 44.6 % (ref 35–45)
HCT VFR BLD CALC: 42 % (ref 36–49)
HGB BLD-MCNC: 14.3 G/DL (ref 12–16)
HGB BLD-MCNC: 14.5 G/DL (ref 12–16)
INR PPP: 0.9 (ref 0.8–1.2)
LVFS 2D: 41.41 %
LVOT MG: 1.62 MMHG
LVOT MV: 0.59 CM/S
LVSV (TEICH): 33.39 ML
LYMPHOCYTES # BLD: 1.6 K/UL (ref 0.9–3.6)
LYMPHOCYTES NFR BLD: 19 % (ref 21–52)
MCH RBC QN AUTO: 28.3 PG (ref 24–34)
MCHC RBC AUTO-ENTMCNC: 32.5 G/DL (ref 31–37)
MCV RBC AUTO: 87.1 FL (ref 74–97)
MONOCYTES # BLD: 0.4 K/UL (ref 0.05–1.2)
MONOCYTES NFR BLD: 4 % (ref 3–10)
MV DEC SLOPE: 2.95
NEUTS SEG # BLD: 6 K/UL (ref 1.8–8)
NEUTS SEG NFR BLD: 73 % (ref 40–73)
P-R INTERVAL, ECG05: 158 MS
P-R INTERVAL, ECG05: 162 MS
PLATELET # BLD AUTO: 300 K/UL (ref 135–420)
PMV BLD AUTO: 10.1 FL (ref 9.2–11.8)
POTASSIUM BLD-SCNC: 3.8 MMOL/L (ref 3.5–5.5)
POTASSIUM SERPL-SCNC: 4.4 MMOL/L (ref 3.5–5.5)
PROT SERPL-MCNC: 8.2 G/DL (ref 6.4–8.2)
PROTHROMBIN TIME: 11.8 SEC (ref 11.5–15.2)
Q-T INTERVAL, ECG07: 388 MS
Q-T INTERVAL, ECG07: 396 MS
QRS DURATION, ECG06: 72 MS
QRS DURATION, ECG06: 74 MS
QTC CALCULATION (BEZET), ECG08: 450 MS
QTC CALCULATION (BEZET), ECG08: 454 MS
RBC # BLD AUTO: 5.12 M/UL (ref 4.2–5.3)
RIGHT CFA DIST SYS PSV: 90.9 CM/S
RIGHT SFA PROX VEL RATIO: 1
RIGHT SUPER FEMORAL PROX SYS PSV: 89.2 CM/S
SODIUM BLD-SCNC: 142 MMOL/L (ref 136–145)
SODIUM SERPL-SCNC: 142 MMOL/L (ref 136–145)
TROPONIN I BLD-MCNC: 1.46 NG/ML (ref 0–0.08)
TROPONIN I SERPL-MCNC: 2.36 NG/ML (ref 0–0.04)
VENTRICULAR RATE, ECG03: 79 BPM
VENTRICULAR RATE, ECG03: 81 BPM
WBC # BLD AUTO: 8.2 K/UL (ref 4.6–13.2)

## 2019-11-26 PROCEDURE — 85347 COAGULATION TIME ACTIVATED: CPT

## 2019-11-26 PROCEDURE — 93005 ELECTROCARDIOGRAM TRACING: CPT

## 2019-11-26 PROCEDURE — 83036 HEMOGLOBIN GLYCOSYLATED A1C: CPT

## 2019-11-26 PROCEDURE — 74011250636 HC RX REV CODE- 250/636: Performed by: INTERNAL MEDICINE

## 2019-11-26 PROCEDURE — 80053 COMPREHEN METABOLIC PANEL: CPT

## 2019-11-26 PROCEDURE — 85025 COMPLETE CBC W/AUTO DIFF WBC: CPT

## 2019-11-26 PROCEDURE — 92941 PRQ TRLML REVSC TOT OCCL AMI: CPT | Performed by: INTERNAL MEDICINE

## 2019-11-26 PROCEDURE — 77030002996 HC SUT SLK J&J -A: Performed by: INTERNAL MEDICINE

## 2019-11-26 PROCEDURE — 80047 BASIC METABLC PNL IONIZED CA: CPT

## 2019-11-26 PROCEDURE — 99152 MOD SED SAME PHYS/QHP 5/>YRS: CPT | Performed by: INTERNAL MEDICINE

## 2019-11-26 PROCEDURE — 74011636320 HC RX REV CODE- 636/320: Performed by: INTERNAL MEDICINE

## 2019-11-26 PROCEDURE — 74011250636 HC RX REV CODE- 250/636: Performed by: EMERGENCY MEDICINE

## 2019-11-26 PROCEDURE — C1894 INTRO/SHEATH, NON-LASER: HCPCS | Performed by: INTERNAL MEDICINE

## 2019-11-26 PROCEDURE — 77030013744: Performed by: INTERNAL MEDICINE

## 2019-11-26 PROCEDURE — 74011250637 HC RX REV CODE- 250/637: Performed by: INTERNAL MEDICINE

## 2019-11-26 PROCEDURE — 77030004558 HC CATH ANGI DX SUPR TORQ CARD -A: Performed by: INTERNAL MEDICINE

## 2019-11-26 PROCEDURE — C1725 CATH, TRANSLUMIN NON-LASER: HCPCS | Performed by: INTERNAL MEDICINE

## 2019-11-26 PROCEDURE — C1887 CATHETER, GUIDING: HCPCS | Performed by: INTERNAL MEDICINE

## 2019-11-26 PROCEDURE — 85610 PROTHROMBIN TIME: CPT

## 2019-11-26 PROCEDURE — 85730 THROMBOPLASTIN TIME PARTIAL: CPT

## 2019-11-26 PROCEDURE — 84484 ASSAY OF TROPONIN QUANT: CPT

## 2019-11-26 PROCEDURE — 82550 ASSAY OF CK (CPK): CPT

## 2019-11-26 PROCEDURE — 77030013797 HC KT TRNSDUC PRSSR EDWD -A: Performed by: INTERNAL MEDICINE

## 2019-11-26 PROCEDURE — 71045 X-RAY EXAM CHEST 1 VIEW: CPT

## 2019-11-26 PROCEDURE — 93458 L HRT ARTERY/VENTRICLE ANGIO: CPT | Performed by: INTERNAL MEDICINE

## 2019-11-26 PROCEDURE — 74011250637 HC RX REV CODE- 250/637

## 2019-11-26 PROCEDURE — 4A023N7 MEASUREMENT OF CARDIAC SAMPLING AND PRESSURE, LEFT HEART, PERCUTANEOUS APPROACH: ICD-10-PCS | Performed by: INTERNAL MEDICINE

## 2019-11-26 PROCEDURE — 82962 GLUCOSE BLOOD TEST: CPT

## 2019-11-26 PROCEDURE — C1874 STENT, COATED/COV W/DEL SYS: HCPCS | Performed by: INTERNAL MEDICINE

## 2019-11-26 PROCEDURE — 74011250637 HC RX REV CODE- 250/637: Performed by: EMERGENCY MEDICINE

## 2019-11-26 PROCEDURE — C8929 TTE W OR WO FOL WCON,DOPPLER: HCPCS

## 2019-11-26 PROCEDURE — 77030013519 HC DEV INFL BASIX MRTM -B: Performed by: INTERNAL MEDICINE

## 2019-11-26 PROCEDURE — B2111ZZ FLUOROSCOPY OF MULTIPLE CORONARY ARTERIES USING LOW OSMOLAR CONTRAST: ICD-10-PCS | Performed by: INTERNAL MEDICINE

## 2019-11-26 PROCEDURE — 93926 LOWER EXTREMITY STUDY: CPT

## 2019-11-26 PROCEDURE — 027034Z DILATION OF CORONARY ARTERY, ONE ARTERY WITH DRUG-ELUTING INTRALUMINAL DEVICE, PERCUTANEOUS APPROACH: ICD-10-PCS | Performed by: INTERNAL MEDICINE

## 2019-11-26 PROCEDURE — 74011000250 HC RX REV CODE- 250: Performed by: INTERNAL MEDICINE

## 2019-11-26 PROCEDURE — 99284 EMERGENCY DEPT VISIT MOD MDM: CPT

## 2019-11-26 PROCEDURE — 99153 MOD SED SAME PHYS/QHP EA: CPT | Performed by: INTERNAL MEDICINE

## 2019-11-26 PROCEDURE — 96375 TX/PRO/DX INJ NEW DRUG ADDON: CPT

## 2019-11-26 PROCEDURE — 96374 THER/PROPH/DIAG INJ IV PUSH: CPT

## 2019-11-26 PROCEDURE — 65620000000 HC RM CCU GENERAL

## 2019-11-26 PROCEDURE — 77030012468 HC VLV BLEEDBK CNTRL ABBT -B: Performed by: INTERNAL MEDICINE

## 2019-11-26 PROCEDURE — C1769 GUIDE WIRE: HCPCS | Performed by: INTERNAL MEDICINE

## 2019-11-26 DEVICE — XIENCE SIERRA™ EVEROLIMUS ELUTING CORONARY STENT SYSTEM 2.75 MM X 12 MM / RAPID-EXCHANGE
Type: IMPLANTABLE DEVICE | Status: FUNCTIONAL
Brand: XIENCE SIERRA™

## 2019-11-26 RX ORDER — HEPARIN SODIUM 1000 [USP'U]/ML
INJECTION, SOLUTION INTRAVENOUS; SUBCUTANEOUS AS NEEDED
Status: DISCONTINUED | OUTPATIENT
Start: 2019-11-26 | End: 2019-11-26 | Stop reason: HOSPADM

## 2019-11-26 RX ORDER — SODIUM CHLORIDE 0.9 % (FLUSH) 0.9 %
5-40 SYRINGE (ML) INJECTION EVERY 8 HOURS
Status: DISCONTINUED | OUTPATIENT
Start: 2019-11-26 | End: 2019-11-27 | Stop reason: HOSPADM

## 2019-11-26 RX ORDER — MORPHINE SULFATE 2 MG/ML
1 INJECTION, SOLUTION INTRAMUSCULAR; INTRAVENOUS
Status: DISCONTINUED | OUTPATIENT
Start: 2019-11-26 | End: 2019-11-27 | Stop reason: HOSPADM

## 2019-11-26 RX ORDER — ATROPINE SULFATE 0.4 MG/ML
0.5 INJECTION, SOLUTION ENDOTRACHEAL; INTRAMEDULLARY; INTRAMUSCULAR; INTRAVENOUS; SUBCUTANEOUS AS NEEDED
Status: DISCONTINUED | OUTPATIENT
Start: 2019-11-26 | End: 2019-11-27 | Stop reason: HOSPADM

## 2019-11-26 RX ORDER — ATORVASTATIN CALCIUM 40 MG/1
80 TABLET, FILM COATED ORAL
Status: DISCONTINUED | OUTPATIENT
Start: 2019-11-26 | End: 2019-11-27 | Stop reason: HOSPADM

## 2019-11-26 RX ORDER — MORPHINE SULFATE 2 MG/ML
2 INJECTION, SOLUTION INTRAMUSCULAR; INTRAVENOUS
Status: DISCONTINUED | OUTPATIENT
Start: 2019-11-26 | End: 2019-11-27 | Stop reason: HOSPADM

## 2019-11-26 RX ORDER — FENTANYL CITRATE 50 UG/ML
50 INJECTION, SOLUTION INTRAMUSCULAR; INTRAVENOUS
Status: COMPLETED | OUTPATIENT
Start: 2019-11-26 | End: 2019-11-26

## 2019-11-26 RX ORDER — HEPARIN SODIUM 1000 [USP'U]/ML
4000 INJECTION, SOLUTION INTRAVENOUS; SUBCUTANEOUS ONCE
Status: COMPLETED | OUTPATIENT
Start: 2019-11-26 | End: 2019-11-26

## 2019-11-26 RX ORDER — MAGNESIUM SULFATE 100 %
4 CRYSTALS MISCELLANEOUS AS NEEDED
Status: DISCONTINUED | OUTPATIENT
Start: 2019-11-26 | End: 2019-11-27 | Stop reason: HOSPADM

## 2019-11-26 RX ORDER — ONDANSETRON 2 MG/ML
4 INJECTION INTRAMUSCULAR; INTRAVENOUS
Status: DISPENSED | OUTPATIENT
Start: 2019-11-26 | End: 2019-11-27

## 2019-11-26 RX ORDER — METOPROLOL TARTRATE 25 MG/1
25 TABLET, FILM COATED ORAL EVERY 12 HOURS
Status: DISCONTINUED | OUTPATIENT
Start: 2019-11-26 | End: 2019-11-27 | Stop reason: HOSPADM

## 2019-11-26 RX ORDER — DEXTROSE 50 % IN WATER (D50W) INTRAVENOUS SYRINGE
25-50 AS NEEDED
Status: DISCONTINUED | OUTPATIENT
Start: 2019-11-26 | End: 2019-11-27 | Stop reason: HOSPADM

## 2019-11-26 RX ORDER — NITROGLYCERIN 0.4 MG/1
0.4 TABLET SUBLINGUAL
Status: ACTIVE | OUTPATIENT
Start: 2019-11-26 | End: 2019-11-27

## 2019-11-26 RX ORDER — INSULIN LISPRO 100 [IU]/ML
INJECTION, SOLUTION INTRAVENOUS; SUBCUTANEOUS
Status: DISCONTINUED | OUTPATIENT
Start: 2019-11-26 | End: 2019-11-27 | Stop reason: HOSPADM

## 2019-11-26 RX ORDER — LEVOTHYROXINE SODIUM 75 UG/1
150 TABLET ORAL
Status: DISCONTINUED | OUTPATIENT
Start: 2019-11-27 | End: 2019-11-27 | Stop reason: HOSPADM

## 2019-11-26 RX ORDER — GUAIFENESIN 100 MG/5ML
324 LIQUID (ML) ORAL
Status: DISCONTINUED | OUTPATIENT
Start: 2019-11-26 | End: 2019-11-26

## 2019-11-26 RX ORDER — GUAIFENESIN 100 MG/5ML
81 LIQUID (ML) ORAL DAILY
Status: DISCONTINUED | OUTPATIENT
Start: 2019-11-26 | End: 2019-11-27 | Stop reason: HOSPADM

## 2019-11-26 RX ORDER — MORPHINE SULFATE 2 MG/ML
INJECTION, SOLUTION INTRAMUSCULAR; INTRAVENOUS
Status: DISPENSED
Start: 2019-11-26 | End: 2019-11-26

## 2019-11-26 RX ORDER — ACETAMINOPHEN 325 MG/1
650 TABLET ORAL
Status: DISCONTINUED | OUTPATIENT
Start: 2019-11-26 | End: 2019-11-27 | Stop reason: HOSPADM

## 2019-11-26 RX ORDER — HYDRALAZINE HYDROCHLORIDE 20 MG/ML
20 INJECTION INTRAMUSCULAR; INTRAVENOUS
Status: ACTIVE | OUTPATIENT
Start: 2019-11-26 | End: 2019-11-26

## 2019-11-26 RX ORDER — ASPIRIN 325 MG
TABLET ORAL
Status: COMPLETED
Start: 2019-11-26 | End: 2019-11-26

## 2019-11-26 RX ORDER — PANTOPRAZOLE SODIUM 40 MG/1
40 TABLET, DELAYED RELEASE ORAL
Status: DISCONTINUED | OUTPATIENT
Start: 2019-11-27 | End: 2019-11-27 | Stop reason: HOSPADM

## 2019-11-26 RX ORDER — MIDAZOLAM HYDROCHLORIDE 1 MG/ML
INJECTION, SOLUTION INTRAMUSCULAR; INTRAVENOUS AS NEEDED
Status: DISCONTINUED | OUTPATIENT
Start: 2019-11-26 | End: 2019-11-26 | Stop reason: HOSPADM

## 2019-11-26 RX ORDER — LIDOCAINE HYDROCHLORIDE 10 MG/ML
INJECTION, SOLUTION EPIDURAL; INFILTRATION; INTRACAUDAL; PERINEURAL AS NEEDED
Status: DISCONTINUED | OUTPATIENT
Start: 2019-11-26 | End: 2019-11-26 | Stop reason: HOSPADM

## 2019-11-26 RX ORDER — OXYCODONE AND ACETAMINOPHEN 5; 325 MG/1; MG/1
1 TABLET ORAL
Status: DISCONTINUED | OUTPATIENT
Start: 2019-11-26 | End: 2019-11-27 | Stop reason: HOSPADM

## 2019-11-26 RX ORDER — SODIUM CHLORIDE 9 MG/ML
75 INJECTION, SOLUTION INTRAVENOUS CONTINUOUS
Status: DISPENSED | OUTPATIENT
Start: 2019-11-26 | End: 2019-11-26

## 2019-11-26 RX ORDER — SODIUM CHLORIDE 9 MG/ML
10 INJECTION INTRAMUSCULAR; INTRAVENOUS; SUBCUTANEOUS
Status: DISCONTINUED | OUTPATIENT
Start: 2019-11-26 | End: 2019-11-26

## 2019-11-26 RX ORDER — SODIUM CHLORIDE 0.9 % (FLUSH) 0.9 %
5-40 SYRINGE (ML) INJECTION AS NEEDED
Status: DISCONTINUED | OUTPATIENT
Start: 2019-11-26 | End: 2019-11-27 | Stop reason: HOSPADM

## 2019-11-26 RX ORDER — HEPARIN SODIUM 200 [USP'U]/100ML
INJECTION, SOLUTION INTRAVENOUS
Status: COMPLETED | OUTPATIENT
Start: 2019-11-26 | End: 2019-11-26

## 2019-11-26 RX ORDER — ATORVASTATIN CALCIUM 40 MG/1
80 TABLET, FILM COATED ORAL
Status: COMPLETED | OUTPATIENT
Start: 2019-11-26 | End: 2019-11-26

## 2019-11-26 RX ORDER — ATORVASTATIN CALCIUM 40 MG/1
40 TABLET, FILM COATED ORAL
Status: COMPLETED | OUTPATIENT
Start: 2019-11-26 | End: 2019-11-26

## 2019-11-26 RX ADMIN — PERFLUTREN 2 ML: 6.52 INJECTION, SUSPENSION INTRAVENOUS at 13:17

## 2019-11-26 RX ADMIN — METOPROLOL TARTRATE 25 MG: 25 TABLET, FILM COATED ORAL at 08:16

## 2019-11-26 RX ADMIN — ATORVASTATIN CALCIUM 40 MG: 40 TABLET, FILM COATED ORAL at 10:03

## 2019-11-26 RX ADMIN — FENTANYL CITRATE 50 MCG: 50 INJECTION, SOLUTION INTRAMUSCULAR; INTRAVENOUS at 03:45

## 2019-11-26 RX ADMIN — OXYCODONE HYDROCHLORIDE AND ACETAMINOPHEN 1 TABLET: 5; 325 TABLET ORAL at 08:24

## 2019-11-26 RX ADMIN — ASPIRIN 81 MG 81 MG: 81 TABLET ORAL at 08:16

## 2019-11-26 RX ADMIN — ATORVASTATIN CALCIUM 80 MG: 40 TABLET, FILM COATED ORAL at 03:45

## 2019-11-26 RX ADMIN — MORPHINE SULFATE 2 MG: 2 INJECTION, SOLUTION INTRAMUSCULAR; INTRAVENOUS at 07:16

## 2019-11-26 RX ADMIN — SODIUM CHLORIDE 10 ML: 9 INJECTION INTRAMUSCULAR; INTRAVENOUS; SUBCUTANEOUS at 06:00

## 2019-11-26 RX ADMIN — ONDANSETRON 4 MG: 2 INJECTION INTRAMUSCULAR; INTRAVENOUS at 07:12

## 2019-11-26 RX ADMIN — TICAGRELOR 90 MG: 90 TABLET ORAL at 16:54

## 2019-11-26 RX ADMIN — ASPIRIN 325 MG: 325 TABLET, FILM COATED ORAL at 03:42

## 2019-11-26 RX ADMIN — Medication 10 ML: at 21:07

## 2019-11-26 RX ADMIN — ATORVASTATIN CALCIUM 80 MG: 40 TABLET, FILM COATED ORAL at 21:06

## 2019-11-26 RX ADMIN — METOPROLOL TARTRATE 25 MG: 25 TABLET, FILM COATED ORAL at 21:06

## 2019-11-26 RX ADMIN — OXYCODONE HYDROCHLORIDE AND ACETAMINOPHEN 1 TABLET: 5; 325 TABLET ORAL at 08:16

## 2019-11-26 RX ADMIN — Medication 10 ML: at 06:00

## 2019-11-26 RX ADMIN — HEPARIN SODIUM 4000 UNITS: 1000 INJECTION INTRAVENOUS; SUBCUTANEOUS at 03:46

## 2019-11-26 NOTE — Clinical Note
Contrast Dose Calculator:   Patient's age: 62.   Patient's sex: Female. Patient weight (kg) = 100. Creatinine level (mg/dL) = 0.8. Creatinine clearance (mL/min): 124. Contrast concentration (mg/mL) = 300. MACD = 300 mL. Max Contrast dose per Creatinine Cl calculator = 279 mL.

## 2019-11-26 NOTE — Clinical Note
TRANSFER - OUT REPORT:     Verbal report given to: Seth Gilbert RN . Report consisted of patient's Situation, Background, Assessment and   Recommendations(SBAR). Opportunity for questions and clarification was provided. Patient transported with a Cardiac Cath Tech / Patient Care Tech and Monitor. Patient transported to: CVT icu 2354.

## 2019-11-26 NOTE — PROGRESS NOTES
conducted an initial consultation and Spiritual Assessment for Sarah Aguilar, who is a 62 y.o.,female. Patient's Primary Language is: [place]. According to the patient's EMR Evangelical Affiliation is: [facility].     The reason the Patient came to the hospital is:   Patient Active Problem List    Diagnosis Date Noted    STEMI (ST elevation myocardial infarction) ([facility] 11/26/2019    Hematoma 11/26/2019    Severe obesity ([facility] 09/23/2019    Hyperlipidemia 06/13/2017    Spondylolisthesis of thoracolumbar region 06/07/2017    Acquired hypothyroidism 06/07/2017    Pre-diabetes 06/07/2017    Chronic bilateral back pain 06/07/2017    Secondary hypertension 06/07/2017    Tobacco abuse 06/07/2017    Colon cancer screening 09/12/2016        The  provided the following Interventions:  Initiated a relationship of care and support. Explored issues of yoselin, belief, spirituality and Restorationism/ritual needs while hospitalized. Listened empathically. Provided chaplaincy education. Provided information about Spiritual Care Services. Offered prayer and assurance of continued prayers on patient's behalf. Chart reviewed. The following outcomes where achieved:  Patient shared limited information about both their medical narrative and spiritual journey/beliefs.  confirmed Patient's Evangelical Affiliation. Patient processed feeling about current hospitalization. Patient expressed gratitude for 's visit. Assessment:  Patient does not have any Restorationism/cultural needs that will affect patient's preferences in health care. There are no spiritual or Restorationism issues which require intervention at this time. Plan:  Chaplains will continue to follow and will provide pastoral care on an as needed/requested basis.  recommends bedside caregivers page  on duty if patient shows signs of acute spiritual or emotional distress.     [phone]

## 2019-11-26 NOTE — Clinical Note
Balloon inflated using multiple inflations inflation technique. Pressure = 8 [place]; Duration = 7 sec. Inflation 2: Pressure: 8 [place]; Duration: 10 sec. Inflation 3: Pressure: 8 [place]; Duration: 12 sec.

## 2019-11-26 NOTE — CONSULTS
[facility] - Consult Note    Consultation request by Fara Puga MD for advice/opinion related to evaluating STEMI (ST elevation myocardial infarction) [facility]) [I21.3]    Date of  Admission: 11/26/2019  3:20 AM   Primary Care Physician:  Scooter Licona NP     Assessment:     -Chest and jaw pain on and off since yesterday: Acute coronary syndrome  -Inferior ST elevation MI  -Hypothyroidism  -Hypertension  -Hyperlipidemia  -Obesity  -GERD  -Tobacco abuse disorder: Quit 6 months ago     Plan:     Patient came to hospital with chest pain. EKG confirmed inferior ST elevation MI. Patient with ongoing chest pain and jaw pain. IV heparin 4000 units now  Aspirin  Nitroglycerin as needed  DW patient regarding management strategy which includes medical management vs. Ischemia evaluation ( non-invasive vs. Invasive). Risk, benefit and alternatives of each strategy discussed in detail. Risk, benefit, complication of LHC and possible PCI ( including but not limited to bleeding, vascular trauma requiring surgery,  infection, heart failure, stroke, MI, emergent bypass surgery, severe allergic reactions, kidney failure, dialysis and death ) were discussed with patient and daughter willing to proceed with procedure. Will be using moderate sedation       History of Present Illness: This is a 62 y.o. female admitted for STEMI (ST elevation myocardial infarction) ([facility] [address] [I21.3]. Patient complains of:    Ms. Kelsie Machuca is a 47-year-old female with hypertension, hyperlipidemia, obesity, tobacco abuse disorder  Patient has been having jaw pain which started yesterday. The jaw pain went ongoing on and off since yesterday and today it radiated down to her neck and chest.  Because of chest discomfort she came to emergency department where she was found to have a ST elevation MI. Code STEMI was called. I saw patient emergently   Patient was still having some jaw pain and chest discomfort.   She denied nausea or vomiting. She did not have this pain before. She has quit smoking 6 months ago. Cardiac risk factors: dyslipidemia, diabetes mellitus, obesity      Review of Symptoms:  Except as stated above include:  Constitutional:  negative  Respiratory:  negative  Cardiovascular:  negative  Gastrointestinal: negative  Genitourinary:  negative  Musculoskeletal:  Negative  Neurological:  Negative  Dermatological:  Negative  Endocrinological: Negative  Psychological:  Negative    A comprehensive review of systems was negative except for that written in the HPI.      Past Medical History:     Past Medical History:   Diagnosis Date    Diabetes ([facility] [address]     Diabetes Type II reported by patient    GERD (gastroesophageal reflux disease)     Hx of abnormal cervical Pap smear     s/p hysterectomy     Hypercholesterolemia     Hypertension     Thyroid disease     hypothyroid         Social History:     Social History     Socioeconomic History    Marital status: SINGLE     Spouse name: Not on file    Number of children: 2    Years of education: 6    Highest education level: GED or equivalent   Occupational History    Occupation:      Employer: [employer] Use    Smoking status: Former Smoker     Packs/day: 1.00     Last attempt to quit: 2019     Years since quittin.5    Smokeless tobacco: Never Used   Substance and Sexual Activity    Alcohol use: Yes     Frequency: Monthly or less     Drinks per session: 3 or 4     Binge frequency: Never     Comment: occasional on holidays    Drug use: No    Sexual activity: Yes     Partners: Male     Birth control/protection: Surgical   Lifestyle    Physical activity:     Days per week: 0 days     Minutes per session: 0 min    Stress: Not at all   Other Topics Concern     Service No    Blood Transfusions No    Caffeine Concern No    Occupational Exposure No    Hobby Hazards No    Sleep Concern No    Stress Concern No    Weight Concern No    Special Diet No    Back Care Yes     Comment: \"Arthritis\"    Exercise No    Bike Helmet No    Seat Belt Yes    Self-Exams No        Family History:     Family History   Problem Relation Age of Onset    Thyroid Disease Mother     Depression Mother     Hypertension Father     Heart Disease Father     Thyroid Disease Sister     Hypertension Sister     Diabetes Maternal Aunt     Diabetes Sister     Eczema Sister     No Known Problems Brother     Alzheimer Paternal Grandmother     Diabetes Paternal Grandmother         Medications: Allergies   Allergen Reactions    Tramadol Vertigo     Pt reported blacking out        No current facility-administered medications for this encounter. Physical Exam:     Visit Vitals  /86 (BP 1 Location: Left arm)   Pulse 97   Temp 99 °F (37.2 °C)   Resp 18   SpO2 100%     BP Readings from Last 3 Encounters:   11/26/19 161/86   11/25/19 163/71   09/23/19 144/79     Pulse Readings from Last 3 Encounters:   11/26/19 97   11/25/19 76   09/23/19 83     Wt Readings from Last 3 Encounters:   11/25/19 220 lb 6.4 oz (100 kg)   09/23/19 219 lb 12.8 oz (99.7 kg)   06/24/19 211 lb 9.6 oz (96 kg)       General:  alert, cooperative, no distress, appears stated age  Neck:  no carotid bruit, no JVD  Lungs:  clear to auscultation bilaterally  Heart:  regular rate and rhythm, S1, S2 normal, no murmur, click, rub or gallop  Abdomen:  abdomen is soft without significant tenderness, masses, organomegaly or guarding  Extremities:  extremities normal, atraumatic, no cyanosis or edema  Skin: Warm and dry.  no hyperpigmentation, vitiligo, or suspicious lesions  Neuro: alert, oriented x3, affect appropriate, no focal neurological deficits, moves all extremities well, no involuntary movements, reflexes at knee and ankle intact  Psych: non focal       Data Review:     Recent Labs     11/26/19  0330   WBC 8.2   HGB 14.5   HCT 44.6        Recent Labs     11/26/19 0330    K 4.4      CO2 29   *   BUN 15   CREA 0.89   CA 8.7   ALB 3.9   SGOT 48*   ALT 25   INR 0.9       Results for orders placed or performed during the hospital encounter of 19   EKG, 12 LEAD, INITIAL   Result Value Ref Range    Ventricular Rate 81 BPM    Atrial Rate 81 BPM    P-R Interval 158 ms    QRS Duration 74 ms    Q-T Interval 388 ms    QTC Calculation (Bezet) 450 ms    Calculated P Axis 70 degrees    Calculated R Axis 54 degrees    Calculated T Axis 73 degrees    Diagnosis       Age and gender specific ECG analysis  Normal sinus rhythm  ST elevation, consider inferior injury or acute infarct  ** ** ACUTE MI / STEMI ** **  Consider right ventricular involvement in acute inferior infarct  Abnormal ECG  When compared with ECG of 2016 11:52,  ST elevation now present in Inferior leads     Results for orders placed or performed during the hospital encounter of 16   ECG HOLTER MONITOR, UP TO 48 HRS    Narrative                               Holter Monitoring Report                               [facility]                      [address] [place] [address], [address]                                         Test Date:    2016  Pat Name:     Delmy Light            Department:     Patient ID:   [id number]                Room:           Gender:       FEMALE                   Technician:   Marisel Coats  :                         Requested By:  Swathi Taylor MD  Order Number:                          Reading MD:   Yahaira Larry MD                             Interpretive Statements  Alton Israel was in [place] [address]. The average heart rate, excluding ectopy, was 85 BPM with a minimum of 60 BPM  at  04:48D3 and a maximum of 147 BPM at   12:59D2. Heart beats, including ectopy, totaled [id number] beats.     VENTRICULAR ECTOPICS totaled 42  averaging  0.9 per hour  with 36 single, 2  paired, 0 trigeminy and 0 R on T.      SUPRAVENTRICULAR ECTOPICS totaled 6 ,with 6 single and 0 paired beats. 1. Rhythm is sinus. 2. CT and QRS are within normal limits. 3. Rare(36) Single PVCs, (3) paired. 4. Rare(6) Single PAC's noted. 5. No notes made in the diary.   Electronically signed on 02-11-16 07:39:38 CST by Yahaira Larry MD       All Cardiac Markers in the last 24 hours:    Lab Results   Component Value Date/Time     (H) 11/26/2019 03:30 AM    CKMB 16.1 (H) 11/26/2019 03:30 AM    CKND1 6.2 (H) 11/26/2019 03:30 AM    TROIQ 2.36 ([facility]) 11/26/2019 03:30 AM    TNIPOC 1.46 (HH) 11/26/2019 03:52 AM       Last Lipid:    Lab Results   Component Value Date/Time    Cholesterol, total 178 09/12/2019 07:25 AM    HDL Cholesterol 55 09/12/2019 07:25 AM    LDL, calculated 97.2 09/12/2019 07:25 AM    Triglyceride 129 09/12/2019 07:25 AM    CHOL/HDL Ratio 3.2 09/12/2019 07:25 AM       Signed By: Nilton Coulter MD     November 26, 2019

## 2019-11-26 NOTE — PROGRESS NOTES
[address] [place] [address]  care of pt from Camacho Gonsalez RN. r groin site assessed; soft but echymotic, pt is positive for a hematoma. Awaiting ultrasound result    1110  Ultrasound is negative for a pseudoaneurysm. Will continue to monitor pt until bedrest complete. 1410 after pt tolerated bedrest, and elevating the head of the bed,  Pt was then allowed to ambulate to the bathroom. Vital signs stable and no complaints of pain noted. Will continue to monitor. 1600 education provided regarding brilinta and metoprolol. Pt compliant and understanding. R. Groin ecchymotic, swollen and tender but soft. Bilateral pedal pulses palpable.   VSS.     1900  Report given to shelli rebollar RN. R. Groin site assessed

## 2019-11-26 NOTE — PROGRESS NOTES
Problem: Falls - Risk of  Goal: *Absence of Falls  Description  Document Chuck Webster Fall Risk and appropriate interventions in the flowsheet.   Outcome: Progressing Towards Goal  Note: Fall Risk Interventions:            Medication Interventions: Bed/chair exit alarm, Evaluate medications/consider consulting pharmacy, Patient to call before getting OOB, Teach patient to arise slowly    Elimination Interventions: Bed/chair exit alarm, Call light in reach, Toileting schedule/hourly rounds              Problem: Patient Education: Go to Patient Education Activity  Goal: Patient/Family Education  Outcome: Progressing Towards Goal

## 2019-11-26 NOTE — H&P
History & Physical    Patient: Brooke Saeed MRN: [id number]  CSN: [id number]    YOB: 1965  Age: 62 y.o. Sex: female      DOA: 11/26/2019    Chief Complaint:   Chief Complaint   Patient presents with    Jaw Pain    Chest Pain          HPI:     Brooke Saeed is a 62 y.o.  female who has history of diabetes hypertension hypothyroidism presents with 1 week history of jaw pain radiating into the left chest arm intermittently over the last week. She was seen by her PCP who started her on antibiotics. But her pain progressed she recently quit smoking 6 months ago. Denies history of heart disease in the emergency room patient had acute ST elevations inferior laterally code STEMI was called patient is now post cardiac cath with drug-eluting stent to the circumflex and 100% blockage of RCA with collaterals please see catheterization for full report seen patient post catheterization.   Now with hematoma post pulling of sheath and a lot of pain    Past Medical History:   Diagnosis Date    Diabetes ([address]     Diabetes Type II reported by patient    GERD (gastroesophageal reflux disease)     Hx of abnormal cervical Pap smear     s/p hysterectomy     Hypercholesterolemia     Hypertension     Thyroid disease     hypothyroid       Past Surgical History:   Procedure Laterality Date    HX CARPAL TUNNEL RELEASE      right hand    HX GYN      partial hysterectomy    HX PARTIAL HYSTERECTOMY         Family History   Problem Relation Age of Onset    Thyroid Disease Mother     Depression Mother     Hypertension Father     Heart Disease Father     Thyroid Disease Sister     Hypertension Sister     Diabetes Maternal Aunt     Diabetes Sister     Eczema Sister     No Known Problems Brother     Alzheimer Paternal Grandmother     Diabetes Paternal Grandmother        Social History     Socioeconomic History    Marital status: SINGLE     Spouse name: Not on file    Number of children: 2    Years of education: 6    Highest education level: GED or equivalent   Occupational History    Occupation:      Employer: [employer] Use    Smoking status: Former Smoker     Packs/day: 1.00     Last attempt to quit: 2019     Years since quittin.5    Smokeless tobacco: Never Used   Substance and Sexual Activity    Alcohol use: Yes     Frequency: Monthly or less     Drinks per session: 3 or 4     Binge frequency: Never     Comment: occasional on holidays    Drug use: No    Sexual activity: Yes     Partners: Male     Birth control/protection: Surgical   Lifestyle    Physical activity:     Days per week: 0 days     Minutes per session: 0 min    Stress: Not at all   Other Topics Concern     Service No    Blood Transfusions No    Caffeine Concern No    Occupational Exposure No    Hobby Hazards No    Sleep Concern No    Stress Concern No    Weight Concern No    Special Diet No    Back Care Yes     Comment: \"Arthritis\"    Exercise No    Bike Helmet No    Seat Belt Yes    Self-Exams No       Prior to Admission medications    Medication Sig Start Date End Date Taking? Authorizing Provider   albuterol (PROVENTIL HFA, VENTOLIN HFA, PROAIR HFA) 90 mcg/actuation inhaler Take 1-2 Puffs by inhalation every four (4) hours as needed for Wheezing. 19   Osman MINER NP   ibuprofen (MOTRIN) 800 mg tablet Take 1 Tab by mouth every six (6) hours as needed for Pain for up to 7 days. 19  Osman MINER NP   amoxicillin (AMOXIL) 500 mg capsule Take 1 Cap by mouth two (2) times a day for 10 days. 19  Osman MINER NP   levothyroxine (SYNTHROID) 150 mcg tablet Take 1 Tab by mouth Daily (before breakfast). 19   Osman MINER NP   amLODIPine-atorvastatin (CADUET) 5-20 mg per tablet Take 1 Tab by mouth daily.  Indications: high cholesterol, high blood pressure 19   Osman MINER NP   naproxen (NAPROSYN) 500 mg tablet Take 1 Tab by mouth two (2) times daily (with meals). 17   Adilene Hilario NP   Omeprazole delayed release (PRILOSEC D/R) 20 mg tablet Take 1 Tab by mouth daily. 16   Adilene Hilario NP       Allergies   Allergen Reactions    Tramadol Vertigo     Pt reported blacking out         Review of Systems  GENERAL: Patient alert, awake and oriented times 3, able to communicate full sentences and not in distress. HEENT: No change in vision, no earache, tinnitus, sore throat or sinus congestion. NECK: No pain or stiffness. PULMONARY: No shortness of breath, cough or wheeze. Cardiovascular: no pnd or orthopnea, +CP  GASTROINTESTINAL: No abdominal po ain,+ nausea, +vomiting or diarrhea, melena or bright red blood per rectum. GENITOURINARY: No urinary frequency, urgency, hesitancy or dysuria. MUSCULOSKELETAL: No joint or muscle pain, no back pain, no recent trauma. DERMATOLOGIC: No rash, no itching, no lesions. ENDOCRINE: No polyuria, polydipsia, no heat or cold intolerance. No recent change in weight. HEMATOLOGICAL: No anemia or easy bruising or bleeding. NEUROLOGIC: No headache, seizures, numbness, tingling or weakness. Physical Exam:     Physical Exam:  Visit Vitals  /86 (BP 1 Location: Left arm)   Pulse 97   Temp 99 °F (37.2 °C)   Resp 18   SpO2 100%      O2 Device: Room air    Temp (24hrs), Av °F (37.2 °C), Min:99 °F (37.2 °C), Max:99 °F (37.2 °C)    No intake/output data recorded. No intake/output data recorded. General:  Alert, cooperative, no distress, appears stated age. Head: Normocephalic, without obvious abnormality, atraumatic. Eyes:  Conjunctivae/corneas clear. .   Nose: Nares normal. No drainage or sinus tenderness. Neck: Supple, symmetrical, trachea midline, no adenopathy, thyroid: no enlargement, no carotid bruit and no JVD. Lungs:   Clear to auscultation bilaterally.    Heart:  Regular rate and rhythm, S1, S2 normal. Abdomen: Soft, non-tender. Bowel sounds normal.    Extremities: Extremities normal, atraumatic, no cyanosis or edema. Right groin hematoma   Pulses: 2+ and symmetric all extremities. Skin:  No rashes or lesions   Neurologic: AAOx3, No focal motor defecits lying flat       Labs Reviewed: All lab results for the last 24 hours reviewed. CXR,  and EKG    Procedures/imaging: see electronic medical records for all procedures/Xrays and details which were not copied into this note but were reviewed prior to creation of Plan    Recent Results (from the past 24 hour(s))   CARDIAC PANEL,(CK, CKMB & TROPONIN)    Collection Time: 11/26/19  3:30 AM   Result Value Ref Range     (H) 26 - 192 U/L    CK - MB 16.1 (H) <3.6 ng/ml    CK-MB Index 6.2 (H) 0.0 - 4.0 %    Troponin-I, QT 2.36 (HH) 0.0 - 0.045 NG/ML   CBC WITH AUTOMATED DIFF    Collection Time: 11/26/19  3:30 AM   Result Value Ref Range    WBC 8.2 4.6 - 13.2 K/uL    RBC 5.12 4.20 - 5.30 M/uL    HGB 14.5 12.0 - 16.0 g/dL    HCT 44.6 35.0 - 45.0 %    MCV 87.1 74.0 - 97.0 FL    MCH 28.3 24.0 - 34.0 PG    MCHC 32.5 31.0 - 37.0 g/dL    RDW 13.3 11.6 - 14.5 %    PLATELET 051 430 - 216 K/uL    MPV 10.1 9.2 - 11.8 FL    NEUTROPHILS 73 40 - 73 %    LYMPHOCYTES 19 (L) 21 - 52 %    MONOCYTES 4 3 - 10 %    EOSINOPHILS 4 0 - 5 %    BASOPHILS 0 0 - 2 %    ABS. NEUTROPHILS 6.0 1.8 - 8.0 K/UL    ABS. LYMPHOCYTES 1.6 0.9 - 3.6 K/UL    ABS. MONOCYTES 0.4 0.05 - 1.2 K/UL    ABS. EOSINOPHILS 0.3 0.0 - 0.4 K/UL    ABS.  BASOPHILS 0.0 0.0 - 0.1 K/UL    DF AUTOMATED     METABOLIC PANEL, COMPREHENSIVE    Collection Time: 11/26/19  3:30 AM   Result Value Ref Range    Sodium 142 136 - 145 mmol/L    Potassium 4.4 3.5 - 5.5 mmol/L    Chloride 107 100 - 111 mmol/L    CO2 29 21 - 32 mmol/L    Anion gap 6 3.0 - 18 mmol/L    Glucose 110 (H) 74 - 99 mg/dL    BUN 15 7.0 - 18 MG/DL    Creatinine 0.89 0.6 - 1.3 MG/DL    BUN/Creatinine ratio 17 12 - 20      GFR est AA >60 >60 ml/min/1.73m2    GFR est non-AA >60 >60 ml/min/1.73m2    Calcium 8.7 8.5 - 10.1 MG/DL    Bilirubin, total 0.5 0.2 - 1.0 MG/DL    ALT (SGPT) 25 13 - 56 U/L    AST (SGOT) 48 (H) 10 - 38 U/L    Alk.  phosphatase 60 45 - 117 U/L    Protein, total 8.2 6.4 - 8.2 g/dL    Albumin 3.9 3.4 - 5.0 g/dL    Globulin 4.3 (H) 2.0 - 4.0 g/dL    A-G Ratio 0.9 0.8 - 1.7     PROTHROMBIN TIME + INR    Collection Time: 11/26/19  3:30 AM   Result Value Ref Range    Prothrombin time 11.8 11.5 - 15.2 sec    INR 0.9 0.8 - 1.2     PTT    Collection Time: 11/26/19  3:30 AM   Result Value Ref Range    aPTT 23.9 23.0 - 36.4 SEC   EKG, 12 LEAD, INITIAL    Collection Time: 11/26/19  3:33 AM   Result Value Ref Range    Ventricular Rate 81 BPM    Atrial Rate 81 BPM    P-R Interval 158 ms    QRS Duration 74 ms    Q-T Interval 388 ms    QTC Calculation (Bezet) 450 ms    Calculated P Axis 70 degrees    Calculated R Axis 54 degrees    Calculated T Axis 73 degrees    Diagnosis       Age and gender specific ECG analysis  Normal sinus rhythm  ST elevation, consider inferior injury or acute infarct  ** ** ACUTE MI / STEMI ** **  Consider right ventricular involvement in acute inferior infarct  Abnormal ECG  When compared with ECG of 14-JAN-2016 11:52,  ST elevation now present in Inferior leads     POC TROPONIN-I    Collection Time: 11/26/19  3:52 AM   Result Value Ref Range    Troponin-I (POC) 1.46 (HH) 0.00 - 0.08 ng/mL   POC CHEM8    Collection Time: 11/26/19  3:55 AM   Result Value Ref Range    CO2, POC 26 (H) 19 - 24 MMOL/L    Glucose,  (H) 74 - 106 MG/DL    BUN, POC 16 7 - 18 MG/DL    Creatinine, POC 0.8 0.6 - 1.3 MG/DL    GFRAA, POC >60 >60 ml/min/1.73m2    GFRNA, POC >60 >60 ml/min/1.73m2    Sodium,  136 - 145 MMOL/L    Potassium, POC 3.8 3.5 - 5.5 MMOL/L    Calcium, ionized (POC) 1.15 1.12 - 1.32 mmol/L    Chloride,  100 - 108 MMOL/L    Anion gap, POC 14 10 - 20      Hematocrit, POC 42 36 - 49 %    Hemoglobin, POC 14.3 12 - 16 G/DL   POC ACTIVATED CLOTTING TIME    Collection Time: 11/26/19  4:33 AM   Result Value Ref Range    Activated Clotting Time (POC) 158 (H) 79 - 138 SECS   POC ACTIVATED CLOTTING TIME    Collection Time: 11/26/19  4:48 AM   Result Value Ref Range    Activated Clotting Time (POC) 230 (H) 79 - 138 SECS   CARDIAC PROCEDURE    Collection Time: 11/26/19  5:17 AM   Result Value Ref Range    EDP 18      Assessment/Plan     Active Problems:    STEMI (ST elevation myocardial infarction) ([facility] [address] (11/26/2019)     Patient is started on aspirin Brilinta and metoprolol as well as Lipitor 80    Hematoma  Given morphine for pain   We will check vascular ultrasound for aneurysm    Diabetes now diet controlled  Sliding scale insulin check A1c    Hypothyroidism   check TSH continue Synthroid         DVT/GI Prophylaxis: SCD's    Discussed with patient at bedside about hospital admission and my plan care, who understood and agree with my plan care.     Bruna Russell MD  11/26/2019 4:25 AM

## 2019-11-26 NOTE — PROGRESS NOTES
CM consulted due to medication costs, spoke with patient who stated she goes to [facility] for primary care and they help with prescriptions. Patient also has a discount card from [employer] where she works to use their pharmacy. Patient will be provided with indigent medications upon d/c from [facility] and [facility] for assistance program. Patient does not qualify for Medicaid due to over-resourced. Reason for Admission:  STEMI (ST elevation myocardial infarction) ([facility] [address] [I21.3]                 RRAT Score:    9            Plan for utilizing home health:    tbd                      Likelihood of Readmission:   LOW                         Transition of Care Plan:              Initial assessment completed with patient. Cognitive status of patient: oriented to time, place, person and situation. Face sheet information confirmed:  yes. The patient designates sister and children to participate in her discharge plan and to receive any needed information. This patient lives in a single family home with [place]. Patient is able to navigate steps as needed. Prior to hospitalization, patient was considered to be independent with ADLs/IADLS : yes . Patient has a current ACP document on file: no  The family will be available to transport patient home upon discharge. The patient has no medical equipment available in the home. Patient is not currently active with home health. Patient has not stayed in a skilled nursing facility or rehab. This patient is on dialysis :no    Currently, the discharge plan is Home. The patient states that she can obtain her medications from the pharmacy, and take her medications as directed.     Patient's current insurance is SELF PAY       Care Management Interventions  PCP Verified by CM: Yes([facility])  Palliative Care Criteria Met (RRAT>21 & CHF Dx)?: No  Mode of Transport at Discharge: Self  Transition of Care Consult (CM Consult): Discharge Planning  Current Support Network:  Other(children)  Confirm Follow Up Transport: Family  Plan discussed with Pt/Family/Caregiver: Yes  Discharge Location  Discharge Placement: Home        ARIADNE Melvin  Case Management  [phone]

## 2019-11-26 NOTE — PROGRESS NOTES
0700: Bedside and Verbal shift change report given to writer (oncoming nurse) by Xena Alejandra (offgoing nurse). Report included the following information OR Summary, Procedure Summary, Intake/Output, MAR, Accordion, Recent Results, Med Rec Status, Cardiac Rhythm ., Alarm Parameters , Procedure Verification, Quality Measures and Dual Neuro Assessment. 1015: Bedside and Verbal shift change report given to [address] (oncoming nurse) by Diane Jack (offgoing nurse). Report included the following information OR Summary, Procedure Summary, Intake/Output, MAR, Accordion, Recent Results, Med Rec Status, Cardiac Rhythm ., Alarm Parameters , Procedure Verification, Quality Measures and Dual Neuro Assessment.

## 2019-11-26 NOTE — PROGRESS NOTES
[facility] - Progress Note  Admit Date: 11/26/2019    Assessment:     -s/p inferior STEMI 11/26/19, s/p LCx stent, % occluded, suspect chronic  -Right groin hematoma  -Hypothyroidism  -h/o Hypertension  -Hyperlipidemia  -Obesity  -GERD  -Tobacco abuse disorder: Quit 6 months ago    No primary cardiologist    Plan:     Sheath removed, small hematoma, await U/S results. Consider vascular consult depending upon results. Plan DAPTA, BB, statin, followup echo. Subjective:     C/o right groin pain. Objective:      Patient Vitals for the past 8 hrs:   Temp Pulse Resp BP SpO2   11/26/19 0816  75  101/54    11/26/19 0747  72 13 98/48 97 %   11/26/19 0630  82 17 130/76 99 %   11/26/19 0615  85 12 133/70 98 %   11/26/19 0600  76 14 126/66 98 %   11/26/19 0545  77 12 119/70 99 %   11/26/19 0535 97.4 °F (36.3 °C) 83 13 147/63 98 %   11/26/19 0357 99 °F (37.2 °C) 97 18 161/86 100 %   11/26/19 0338  95 13  100 %   11/26/19 0337    160/75          No data found.                  Intake/Output Summary (Last 24 hours) at 11/26/2019 0847  Last data filed at 11/26/2019 0535  Gross per 24 hour   Intake 0 ml   Output 0 ml   Net 0 ml       Physical Exam:  General:  alert, cooperative, no distress, appears stated age  Neck:  nontender  Lungs:  clear to auscultation bilaterally  Heart:  regular rate and rhythm, S1, S2 normal, no murmur, click, rub or gallop  Abdomen:  abdomen is soft without significant tenderness, masses, organomegaly or guarding  Extremities:  extremities normal, atraumatic, no cyanosis or edema, right groin hematoma with bruising, 2x3 cm, good peripheral pulses    Data Review:     Labs: Results:       Chemistry Recent Labs     11/26/19  0330   *      K 4.4      CO2 29   BUN 15   CREA 0.89   CA 8.7   AGAP 6   BUCR 17   AP 60   TP 8.2   ALB 3.9   GLOB 4.3*   AGRAT 0.9      CBC w/Diff Recent Labs     11/26/19  0330   WBC 8.2   RBC 5.12   HGB 14.5   HCT 44.6    GRANS 73   LYMPH 19*   EOS 4      Cardiac Enzymes Lab Results   Component Value Date/Time     (H) 11/26/2019 03:30 AM    CKMB 16.1 (H) 11/26/2019 03:30 AM    CKND1 6.2 (H) 11/26/2019 03:30 AM    TROIQ 2.36 (HH) 11/26/2019 03:30 AM    TNIPOC 1.46 () 11/26/2019 03:52 AM      Coagulation Recent Labs     11/26/19  0330   PTP 11.8   INR 0.9   APTT 23.9       Lipid Panel Lab Results   Component Value Date/Time    Cholesterol, total 178 09/12/2019 07:25 AM    HDL Cholesterol 55 09/12/2019 07:25 AM    LDL, calculated 97.2 09/12/2019 07:25 AM    VLDL, calculated 25.8 09/12/2019 07:25 AM    Triglyceride 129 09/12/2019 07:25 AM    CHOL/HDL Ratio 3.2 09/12/2019 07:25 AM      BNP No results found for: BNP, BNPP, XBNPT   Liver Enzymes Recent Labs     11/26/19  0330   TP 8.2   ALB 3.9   AP 60   SGOT 48*      Digoxin    Thyroid Studies Lab Results   Component Value Date/Time    TSH 18.60 (H) 09/12/2019 07:25 AM          Signed By: Seble Trinidad MD     November 26, 2019

## 2019-11-26 NOTE — Clinical Note
Lesion located in the Proximal Cx. Balloon inflated using multiple inflations inflation technique. Pressure = 12 [place]; Duration = 6 sec. Inflation 2: Pressure: 12 [place]; Duration: 11 sec.

## 2019-11-26 NOTE — Clinical Note
Lesion: Located in the Proximal Cx. Stent deployed. Single technique and multiple inflations used. First inflation pressure = 12 [place]; inflation time: 13 sec.

## 2019-11-26 NOTE — ED TRIAGE NOTES
Pt came to triage c/o left jaw pain that radiates to neck and chest, EKG showed STEMI, STEMI alert called, pt prepped for cath lab.

## 2019-11-26 NOTE — ED PROVIDER NOTES
EMERGENCY DEPARTMENT HISTORY AND PHYSICAL EXAM    3:51 AM      Date: 2019  Patient Name: Natalia Parrish    History of Presenting Illness     No chief complaint on file. History Provided By: Patient      Additional History (Context): Natalia Parrish is a 62 y.o. female with hypertension and Hypothyroidism, prediabetes who presents with jaw pain radiating into her chest and down her arms, concerned she is having a heart attack. This started yesterday when she was at work in the afternoon, she went to her doctor who said it was dental pain and sent her home. Her father  from a massive heart attack when she was younger, she is a recent ex-smoker, quit 6 months ago. No history of issues with bleeding. Never had any heart work-up in the past.  Denies any cough, shortness of breath    PCP: Sofie Fabian NP    Current Facility-Administered Medications   Medication Dose Route Frequency Provider Last Rate Last Dose    aspirin chewable tablet 324 mg  324 mg Oral NOW Ronen Marion MD        atorvastatin (LIPITOR) tablet 80 mg  80 mg Oral NOW Ronen Marion MD        heparin (porcine) 1,000 unit/mL injection 4,000 Units  4,000 Units IntraVENous ONCE Ronen Marion MD        fentaNYL citrate (PF) injection 50 mcg  50 mcg IntraVENous NOW Ronen Marion MD         Current Outpatient Medications   Medication Sig Dispense Refill    albuterol (PROVENTIL HFA, VENTOLIN HFA, PROAIR HFA) 90 mcg/actuation inhaler Take 1-2 Puffs by inhalation every four (4) hours as needed for Wheezing. 1 Inhaler 0    ibuprofen (MOTRIN) 800 mg tablet Take 1 Tab by mouth every six (6) hours as needed for Pain for up to 7 days. 21 Tab 0    amoxicillin (AMOXIL) 500 mg capsule Take 1 Cap by mouth two (2) times a day for 10 days. 20 Cap 0    levothyroxine (SYNTHROID) 150 mcg tablet Take 1 Tab by mouth Daily (before breakfast). 30 Tab 4    amLODIPine-atorvastatin (CADUET) 5-20 mg per tablet Take 1 Tab by mouth daily. Indications: high cholesterol, high blood pressure 90 Tab 1    naproxen (NAPROSYN) 500 mg tablet Take 1 Tab by mouth two (2) times daily (with meals). 30 Tab 3    Omeprazole delayed release (PRILOSEC D/R) 20 mg tablet Take 1 Tab by mouth daily. 27 Tab 5       Past History     Past Medical History:  Past Medical History:   Diagnosis Date    Diabetes ([address]     Diabetes Type II reported by patient    GERD (gastroesophageal reflux disease)     Hx of abnormal cervical Pap smear     s/p hysterectomy     Hypercholesterolemia     Hypertension     Thyroid disease     hypothyroid       Past Surgical History:  Past Surgical History:   Procedure Laterality Date    HX CARPAL TUNNEL RELEASE      right hand    HX GYN      partial hysterectomy    HX PARTIAL HYSTERECTOMY         Family History:  Family History   Problem Relation Age of Onset    Thyroid Disease Mother     Depression Mother     Hypertension Father     Heart Disease Father     Thyroid Disease Sister     Hypertension Sister     Diabetes Maternal Aunt     Diabetes Sister     Eczema Sister     No Known Problems Brother     Alzheimer Paternal Grandmother     Diabetes Paternal Grandmother        Social History:  Social History     Tobacco Use    Smoking status: Former Smoker     Packs/day: 1.00     Last attempt to quit: 2019     Years since quittin.5    Smokeless tobacco: Never Used   Substance Use Topics    Alcohol use: Yes     Frequency: Monthly or less     Drinks per session: 3 or 4     Binge frequency: Never     Comment: occasional on holidays    Drug use: No       Allergies: Allergies   Allergen Reactions    Tramadol Vertigo     Pt reported blacking out         Review of Systems       Review of Systems   Constitutional: Negative. Negative for activity change, chills and fever. HENT: Negative. Negative for ear pain, hearing loss and sore throat. Eyes: Negative. Negative for pain and visual disturbance.    Respiratory: Negative. Negative for cough, chest tightness and shortness of breath. Cardiovascular: Positive for chest pain. Negative for leg swelling. Gastrointestinal: Negative. Negative for abdominal distention and abdominal pain. Genitourinary: Negative. Negative for dysuria and hematuria. Musculoskeletal: Negative. Skin: Negative. Negative for rash. Neurological: Negative. Negative for dizziness and headaches. Psychiatric/Behavioral: Negative. Negative for agitation and behavioral problems. Physical Exam   There were no vitals taken for this visit. Physical Exam  Constitutional:       Appearance: She is well-developed. HENT:      Head: Normocephalic and atraumatic. Mouth/Throat:      Mouth: Mucous membranes are moist.   Eyes:      General:         Right eye: No discharge. Left eye: No discharge. Pupils: Pupils are equal, round, and reactive to light. Neck:      Musculoskeletal: Normal range of motion and neck supple. Vascular: No JVD. Trachea: No tracheal deviation. Cardiovascular:      Rate and Rhythm: Normal rate and regular rhythm. Heart sounds: Normal heart sounds. No murmur. Pulmonary:      Effort: Pulmonary effort is normal. No respiratory distress. Breath sounds: Normal breath sounds. No wheezing or rales. Abdominal:      General: Bowel sounds are normal. There is no distension. Palpations: Abdomen is soft. Tenderness: There is no tenderness. There is no rebound. Musculoskeletal: Normal range of motion. General: No tenderness or deformity. Skin:     General: Skin is warm and dry. Findings: No erythema or rash. Neurological:      Mental Status: She is alert and oriented to person, place, and time. Cranial Nerves: No cranial nerve deficit.       Comments: 5/5 strength UE/LE, 5/5 sensation UE/LE     Psychiatric:         Behavior: Behavior normal.           Diagnostic Study Results     Labs -  Recent Results (from the past 12 hour(s))   CBC WITH AUTOMATED DIFF    Collection Time: 11/26/19  3:30 AM   Result Value Ref Range    WBC 8.2 4.6 - 13.2 K/uL    RBC 5.12 4.20 - 5.30 M/uL    HGB 14.5 12.0 - 16.0 g/dL    HCT 44.6 35.0 - 45.0 %    MCV 87.1 74.0 - 97.0 FL    MCH 28.3 24.0 - 34.0 PG    MCHC 32.5 31.0 - 37.0 g/dL    RDW 13.3 11.6 - 14.5 %    PLATELET 180 716 - 527 K/uL    MPV 10.1 9.2 - 11.8 FL    NEUTROPHILS 73 40 - 73 %    LYMPHOCYTES 19 (L) 21 - 52 %    MONOCYTES 4 3 - 10 %    EOSINOPHILS 4 0 - 5 %    BASOPHILS 0 0 - 2 %    ABS. NEUTROPHILS 6.0 1.8 - 8.0 K/UL    ABS. LYMPHOCYTES 1.6 0.9 - 3.6 K/UL    ABS. MONOCYTES 0.4 0.05 - 1.2 K/UL    ABS. EOSINOPHILS 0.3 0.0 - 0.4 K/UL    ABS. BASOPHILS 0.0 0.0 - 0.1 K/UL    DF AUTOMATED     EKG, 12 LEAD, INITIAL    Collection Time: 11/26/19  3:33 AM   Result Value Ref Range    Ventricular Rate 81 BPM    Atrial Rate 81 BPM    P-R Interval 158 ms    QRS Duration 74 ms    Q-T Interval 388 ms    QTC Calculation (Bezet) 450 ms    Calculated P Axis 70 degrees    Calculated R Axis 54 degrees    Calculated T Axis 73 degrees    Diagnosis       Age and gender specific ECG analysis  Normal sinus rhythm  ST elevation, consider inferior injury or acute infarct  ** ** ACUTE MI / STEMI ** **  Consider right ventricular involvement in acute inferior infarct  Abnormal ECG  When compared with ECG of 14-JAN-2016 11:52,  ST elevation now present in Inferior leads         Radiologic Studies -   No orders to display         Medical Decision Making   I am the first provider for this patient. I reviewed the vital signs, available nursing notes, past medical history, past surgical history, family history and social history. Vital Signs-Reviewed the patient's vital signs. EKG: Interpreted by the EP.    Time Interpreted: 333   Rate: 81   Rhythm: Normal Sinus Rhythm    Interpretation: normal axis, normal intervals, inferior MI without suggestion of RV infarct    Comparison: no prior ST elevation    Records Reviewed: Nursing Notes and Old Medical Records      Provider Notes (Medical Decision Making):     MDM  Number of Diagnoses or Management Options  Acute ST elevation myocardial infarction (STEMI) involving other coronary artery of inferior wall [facility]):   Diagnosis management comments: Differential Diagnosis: Inferior MI, pericarditis, dissection    Patient coming in with jaw pain radiating down into her chest, family history of cardiac disease although not in her, EKG highly concerning for inferior MI with high lateral depressions, no EKG findings for RV infarct, blood pressure somewhat elevated at 899 systolic, will try to control pain with fentanyl, consider trialing 1 nitro although concern with inferior involvement although doubt RV. STEMI called upon seeing EKG, discussed with cardiologist who is on his way in. Atypical story but cannot ignore the EKG, will get chest x-ray to rule out widened mediastinum suggestive of dissection. Reevaluation: cardiology taking pt to cath lab, d/w hospitalist              For Hospitalized Patients:    1. Critical Care Time: Critical Care Time:  The services I provided to this patient were to treat and/or prevent clinically significant deterioration that could result in the failure of one or more body systems and/or organ systems due to STEMI. Services included the following:  -reviewing nursing notes and old charts  -vital sign assessments  -direct patient care  -medication orders and management  -interpreting and reviewing diagnostic studies/labs  -re-evaluations  -documentation time    Aggregate critical care time was 22 minutes, which includes only time during which I was engaged in work directly related to the patient's care as described above, whether I was at bedside or elsewhere in the Emergency Department.  It did not include time spent performing other reported procedures or the services of residents, students, nurses, or advance practice providers. Grabiel Everett MD    3:55 AM         Diagnosis     Clinical Impression:   1. Acute ST elevation myocardial infarction (STEMI) involving other coronary artery of inferior wall (HCC)        Disposition: Admit    Follow-up Information    None          Patient's Medications   Start Taking    No medications on file   Continue Taking    ALBUTEROL (PROVENTIL HFA, VENTOLIN HFA, PROAIR HFA) 90 MCG/ACTUATION INHALER    Take 1-2 Puffs by inhalation every four (4) hours as needed for Wheezing. AMLODIPINE-ATORVASTATIN (CADUET) 5-20 MG PER TABLET    Take 1 Tab by mouth daily. Indications: high cholesterol, high blood pressure    AMOXICILLIN (AMOXIL) 500 MG CAPSULE    Take 1 Cap by mouth two (2) times a day for 10 days. IBUPROFEN (MOTRIN) 800 MG TABLET    Take 1 Tab by mouth every six (6) hours as needed for Pain for up to 7 days. LEVOTHYROXINE (SYNTHROID) 150 MCG TABLET    Take 1 Tab by mouth Daily (before breakfast). NAPROXEN (NAPROSYN) 500 MG TABLET    Take 1 Tab by mouth two (2) times daily (with meals). OMEPRAZOLE DELAYED RELEASE (PRILOSEC D/R) 20 MG TABLET    Take 1 Tab by mouth daily. These Medications have changed    No medications on file   Stop Taking    No medications on file     _______________________________    Please note that this dictation was completed with [employer], the computer voice recognition software. Quite often unanticipated grammatical, syntax, homophones, and other interpretive errors are inadvertently transcribed by the computer software. Please disregard these errors. Please excuse any errors that have escaped final proofreading.

## 2019-11-26 NOTE — PROGRESS NOTES
7935-  Pt admitted from cath lab with right femoral arterial sheath in place connected to a pressure bag and transduced. Per cath lab report ACT too elevated at this time to remove. Cath lab personal to return and re-check ACT for removal by cath lab tech. Leg immobilizer placed on RLE. Cath site WNL. Site tender to touch. VSS. EKG completed. Daughter at bedside. Labs noted. POC reviewed with pt. Post-cath procedure assessment per protocol. Refer to chart. Will monitor. 5039-  Cath lab tech at bedside repeating ACT.

## 2019-11-26 NOTE — PROGRESS NOTES
Progress Note         Patient: Dorisann Prader MRN: [id number]  CSN: [id number]    YOB: 1965  Age: 62 y.o. Sex: female    DOA: 11/26/2019 LOS:  LOS: 0 days                    Subjective:     Dorisann Prader is a 62 y.o. female with a PMHx of HTN, HLD, hypothyroidism and Type II DM who is admitted for STEMI. Seen in room in bed. Comfortable in bed  Denies CP, SOB, n/v/abd pain. C/o pain and hematoma to R groin  S/P cath and SARAH to LCx for 100% occluded RCA         Objective:     Physical Exam:  Visit Vitals  /57 (BP 1 Location: Left arm, BP Patient Position: At rest;Sitting)   Pulse 88   Temp 98.1 °F (36.7 °C)   Resp 16   Ht 5' 4\" (1.626 m)   Wt 99.8 kg (220 lb)   SpO2 95%   Breastfeeding No   BMI 37.76 kg/m²        General:         Alert, cooperative, no acute distress    HEENT: NC, Atraumatic. Anicteric sclerae. Lungs: CTA Bilaterally. No Wheezing/Rhonchi/Rales. Heart:  Regular  rhythm,  No murmur, No Rubs, No Gallops  Abdomen: Soft, Non distended, Non tender. +Bowel sounds  Extremities: Large hematoma to R groin, bandage in place, no active bleeding   Psych:   Good insight. Not anxious or agitated. Neurologic:  Alert and oriented X 3. No acute neurological deficits. Intake and Output:  Current Shift:  11/26 0701 - 11/26 1900  In: 1463.8 [P.O.:720; I.V.:743.8]  Out: 1300 [Urine:1300]  Last three shifts:  No intake/output data recorded.     Labs: Results:       Chemistry Recent Labs     11/26/19 0330   *      K 4.4      CO2 29   BUN 15   CREA 0.89   CA 8.7   AGAP 6   BUCR 17   AP 60   TP 8.2   ALB 3.9   GLOB 4.3*   AGRAT 0.9      CBC w/Diff Recent Labs     11/26/19 0330   WBC 8.2   RBC 5.12   HGB 14.5   HCT 44.6      GRANS 73   LYMPH 19*   EOS 4      Cardiac Enzymes Recent Labs     11/26/19  0330   *   CKND1 6.2*      Coagulation Recent Labs     11/26/19 0330   PTP 11.8   INR 0.9   APTT 23.9       Lipid Panel Lab Results   Component Value Date/Time    Cholesterol, total 178 09/12/2019 07:25 AM    HDL Cholesterol 55 09/12/2019 07:25 AM    LDL, calculated 97.2 09/12/2019 07:25 AM    VLDL, calculated 25.8 09/12/2019 07:25 AM    Triglyceride 129 09/12/2019 07:25 AM    CHOL/HDL Ratio 3.2 09/12/2019 07:25 AM      BNP No results for input(s): BNPP in the last 72 hours. Liver Enzymes Recent Labs     11/26/19  0330   TP 8.2   ALB 3.9   AP 60   SGOT 48*      Thyroid Studies Lab Results   Component Value Date/Time    TSH 18.60 (H) 09/12/2019 07:25 AM                Assessment and Plan:     Linda Marlow is a 62 y.o. female with a PMHx of HTN, HLD, hypothyroidism and Type II DM who is admitted for STEMI. Inferior STEMI s/p SARAH to LCx for 100% occluded RCA   CAD   R groin hematoma   HTN  HLD   Hx Type II DM   Hypothyroidism   Obesity   GERD   Former smoker       Vascular US shows hematoma, no aneurysm   Cont DAPT with ASA, Brilinta   Cont bb, statin   FU Echo   FU Cardiac panel, EKG in am   FU Am labs   Cont home synthroid, check TSH    Cont protonix   Check HbA1c, SSI         Case discussed with:  [x]Patient  [x]Family  [x]Nursing  [x]Case Management    Diet: Cardiac   Code Status: FULL   Disposition: CVT ICU, >2 midnights       HVarsha Jon DO  11/26/2019

## 2019-11-26 NOTE — DIABETES MGMT
GLYCEMIC CONTROL SCREENING INITIATED:    Lab Results   Component Value Date/Time    Hemoglobin A1c 5.9 (H) 11/26/2019 03:30 AM      Lab Results   Component Value Date/Time    Glucose 110 (H) 11/26/2019 03:30 AM         [x]  Patient meets criteria for pre-diabetes   HbA1c = 5.7-6.4%    STEMI. Status post stent. H/O include hypertension, hyperlipidemia, obesity. Seen patient this afternoon and reported no history of diabetes but (+) family history. She accepted the class schedule for free diabetes classes. Results for Carrington Angel (MRN [id number]) as of 11/26/2019 15:14   Ref. Range 11/26/2019 08:22 11/26/2019 12:08   GLUCOSE,FAST - POC Latest Ref Range: 70 - 110 mg/dL 130 (H) 132 (H)     Patient on correctional lispro insulin ACHS.     Anna Carbone RN [facility]  Pager: [phone]

## 2019-11-27 VITALS
WEIGHT: 220 LBS | BODY MASS INDEX: 37.56 KG/M2 | DIASTOLIC BLOOD PRESSURE: 70 MMHG | OXYGEN SATURATION: 96 % | HEART RATE: 75 BPM | TEMPERATURE: 97.9 F | HEIGHT: 64 IN | SYSTOLIC BLOOD PRESSURE: 138 MMHG | RESPIRATION RATE: 15 BRPM

## 2019-11-27 LAB
ALBUMIN SERPL-MCNC: 3.2 G/DL (ref 3.4–5)
ALBUMIN/GLOB SERPL: 1.1 {RATIO} (ref 0.8–1.7)
ALP SERPL-CCNC: 44 U/L (ref 45–117)
ALT SERPL-CCNC: 19 U/L (ref 13–56)
ANION GAP SERPL CALC-SCNC: 4 MMOL/L (ref 3–18)
AST SERPL-CCNC: 37 U/L (ref 10–38)
ATRIAL RATE: 79 BPM
BASOPHILS # BLD: 0 K/UL (ref 0–0.1)
BASOPHILS NFR BLD: 0 % (ref 0–2)
BILIRUB SERPL-MCNC: 0.5 MG/DL (ref 0.2–1)
BUN SERPL-MCNC: 14 MG/DL (ref 7–18)
BUN/CREAT SERPL: 16 (ref 12–20)
CALCIUM SERPL-MCNC: 8.3 MG/DL (ref 8.5–10.1)
CALCULATED P AXIS, ECG09: 57 DEGREES
CALCULATED R AXIS, ECG10: 42 DEGREES
CALCULATED T AXIS, ECG11: 18 DEGREES
CHLORIDE SERPL-SCNC: 111 MMOL/L (ref 100–111)
CHOLEST SERPL-MCNC: 164 MG/DL
CK MB CFR SERPL CALC: 5 % (ref 0–4)
CK MB SERPL-MCNC: 8.5 NG/ML (ref 5–25)
CK SERPL-CCNC: 170 U/L (ref 26–192)
CO2 SERPL-SCNC: 27 MMOL/L (ref 21–32)
CREAT SERPL-MCNC: 0.88 MG/DL (ref 0.6–1.3)
DIAGNOSIS, 93000: NORMAL
DIFFERENTIAL METHOD BLD: ABNORMAL
EOSINOPHIL # BLD: 0.4 K/UL (ref 0–0.4)
EOSINOPHIL NFR BLD: 5 % (ref 0–5)
ERYTHROCYTE [DISTWIDTH] IN BLOOD BY AUTOMATED COUNT: 13.6 % (ref 11.6–14.5)
GLOBULIN SER CALC-MCNC: 3 G/DL (ref 2–4)
GLUCOSE BLD STRIP.AUTO-MCNC: 102 MG/DL (ref 70–110)
GLUCOSE BLD STRIP.AUTO-MCNC: 109 MG/DL (ref 70–110)
GLUCOSE BLD STRIP.AUTO-MCNC: 128 MG/DL (ref 70–110)
GLUCOSE SERPL-MCNC: 104 MG/DL (ref 74–99)
HBA1C MFR BLD: 5.7 % (ref 4.2–5.6)
HCT VFR BLD AUTO: 36 % (ref 35–45)
HDLC SERPL-MCNC: 41 MG/DL (ref 40–60)
HDLC SERPL: 4 {RATIO} (ref 0–5)
HGB BLD-MCNC: 11.3 G/DL (ref 12–16)
LDLC SERPL CALC-MCNC: 96.4 MG/DL (ref 0–100)
LIPID PROFILE,FLP: NORMAL
LYMPHOCYTES # BLD: 2.4 K/UL (ref 0.9–3.6)
LYMPHOCYTES NFR BLD: 31 % (ref 21–52)
MAGNESIUM SERPL-MCNC: 2.2 MG/DL (ref 1.6–2.6)
MCH RBC QN AUTO: 27.6 PG (ref 24–34)
MCHC RBC AUTO-ENTMCNC: 31.4 G/DL (ref 31–37)
MCV RBC AUTO: 88 FL (ref 74–97)
MONOCYTES # BLD: 0.5 K/UL (ref 0.05–1.2)
MONOCYTES NFR BLD: 7 % (ref 3–10)
NEUTS SEG # BLD: 4.5 K/UL (ref 1.8–8)
NEUTS SEG NFR BLD: 57 % (ref 40–73)
P-R INTERVAL, ECG05: 170 MS
PLATELET # BLD AUTO: 261 K/UL (ref 135–420)
PMV BLD AUTO: 9.8 FL (ref 9.2–11.8)
POTASSIUM SERPL-SCNC: 3.9 MMOL/L (ref 3.5–5.5)
PROT SERPL-MCNC: 6.2 G/DL (ref 6.4–8.2)
Q-T INTERVAL, ECG07: 396 MS
QRS DURATION, ECG06: 74 MS
QTC CALCULATION (BEZET), ECG08: 454 MS
RBC # BLD AUTO: 4.09 M/UL (ref 4.2–5.3)
SODIUM SERPL-SCNC: 142 MMOL/L (ref 136–145)
TRIGL SERPL-MCNC: 133 MG/DL (ref ?–150)
TROPONIN I SERPL-MCNC: 5.53 NG/ML (ref 0–0.04)
TSH SERPL DL<=0.05 MIU/L-ACNC: 0.83 UIU/ML (ref 0.36–3.74)
VENTRICULAR RATE, ECG03: 79 BPM
VLDLC SERPL CALC-MCNC: 26.6 MG/DL
WBC # BLD AUTO: 7.9 K/UL (ref 4.6–13.2)

## 2019-11-27 PROCEDURE — 74011250637 HC RX REV CODE- 250/637: Performed by: INTERNAL MEDICINE

## 2019-11-27 PROCEDURE — 80053 COMPREHEN METABOLIC PANEL: CPT

## 2019-11-27 PROCEDURE — 80061 LIPID PANEL: CPT

## 2019-11-27 PROCEDURE — 83036 HEMOGLOBIN GLYCOSYLATED A1C: CPT

## 2019-11-27 PROCEDURE — 36415 COLL VENOUS BLD VENIPUNCTURE: CPT

## 2019-11-27 PROCEDURE — 82550 ASSAY OF CK (CPK): CPT

## 2019-11-27 PROCEDURE — 84443 ASSAY THYROID STIM HORMONE: CPT

## 2019-11-27 PROCEDURE — 93005 ELECTROCARDIOGRAM TRACING: CPT

## 2019-11-27 PROCEDURE — 83735 ASSAY OF MAGNESIUM: CPT

## 2019-11-27 PROCEDURE — 85025 COMPLETE CBC W/AUTO DIFF WBC: CPT

## 2019-11-27 PROCEDURE — 82962 GLUCOSE BLOOD TEST: CPT

## 2019-11-27 PROCEDURE — 94762 N-INVAS EAR/PLS OXIMTRY CONT: CPT

## 2019-11-27 RX ORDER — METOPROLOL TARTRATE 25 MG/1
25 TABLET, FILM COATED ORAL EVERY 12 HOURS
Qty: 60 TAB | Refills: 3 | Status: SHIPPED | OUTPATIENT
Start: 2019-11-27 | End: 2019-12-04 | Stop reason: SDUPTHER

## 2019-11-27 RX ORDER — ATORVASTATIN CALCIUM 80 MG/1
80 TABLET, FILM COATED ORAL
Qty: 90 TAB | Refills: 1 | Status: SHIPPED | OUTPATIENT
Start: 2019-11-27 | End: 2019-12-04 | Stop reason: SDUPTHER

## 2019-11-27 RX ORDER — GUAIFENESIN 100 MG/5ML
81 LIQUID (ML) ORAL DAILY
Qty: 100 TAB | Refills: 1 | Status: SHIPPED | OUTPATIENT
Start: 2019-11-28

## 2019-11-27 RX ORDER — PHENOL/SODIUM PHENOLATE
20 AEROSOL, SPRAY (ML) MUCOUS MEMBRANE DAILY
Qty: 30 TAB | Refills: 0 | Status: SHIPPED | OUTPATIENT
Start: 2019-11-27

## 2019-11-27 RX ORDER — LEVOTHYROXINE SODIUM 150 UG/1
150 TABLET ORAL
Qty: 30 TAB | Refills: 4 | Status: SHIPPED | OUTPATIENT
Start: 2019-11-27 | End: 2019-12-04 | Stop reason: SDUPTHER

## 2019-11-27 RX ADMIN — TICAGRELOR 90 MG: 90 TABLET ORAL at 08:13

## 2019-11-27 RX ADMIN — LEVOTHYROXINE SODIUM 150 MCG: 75 TABLET ORAL at 05:30

## 2019-11-27 RX ADMIN — Medication 10 ML: at 05:31

## 2019-11-27 RX ADMIN — METOPROLOL TARTRATE 25 MG: 25 TABLET, FILM COATED ORAL at 08:13

## 2019-11-27 RX ADMIN — OXYCODONE HYDROCHLORIDE AND ACETAMINOPHEN 1 TABLET: 5; 325 TABLET ORAL at 15:11

## 2019-11-27 RX ADMIN — PANTOPRAZOLE SODIUM 40 MG: 40 TABLET, DELAYED RELEASE ORAL at 08:13

## 2019-11-27 RX ADMIN — ASPIRIN 81 MG 81 MG: 81 TABLET ORAL at 08:13

## 2019-11-27 RX ADMIN — Medication 10 ML: at 15:11

## 2019-11-27 NOTE — PROGRESS NOTES
I have reviewed discharge instructions with the patient. The patient verbalized understanding. Patient armband removed and shredded. Patient transported via wheelchair to [address] then the front of [facility] by transportation. All belonging with patient.

## 2019-11-27 NOTE — DISCHARGE INSTRUCTIONS
Patient Education        Learning About Percutaneous Coronary Intervention  What is percutaneous coronary intervention? Percutaneous coronary intervention (PCI) is the name for procedures to open a blocked coronary artery. The two most common are coronary angioplasty and coronary stent placement. A PCI is a way to open a blocked coronary artery before, during, or after a heart attack. It gets blood flowing to your heart. And it can help prevent heart problems by widening an artery that has been narrowed by fatty buildup (plaque). This also may be called balloon angioplasty. Before a PCI, a doctor does a test to find blocked arteries. The test is called cardiac catheterization. The doctor puts a tiny tube called a catheter into an artery in your groin or arm. The doctor moves the catheter through the artery to your heart. Then he or she puts a dye into the catheter. This makes your heart's arteries show up on a screen so the doctor can see any blockages. The test also can measure the pressure inside your heart's chambers. If you have a blocked artery, the doctor may do an angioplasty or a coronary stent procedure. In an angioplasty, the doctor uses a catheter with a tiny balloon at the tip. He or she puts it into the blocked area and inflates it. The balloon presses the plaque against the walls of the artery. This makes more room for blood to flow. In most cases, the doctor then puts a stent in the artery. A stent is a small, expandable tube that presses against the walls of the artery. The stent is left in the artery to keep the artery open. This helps blood flow. It also may keep small pieces of plaque from breaking off and causing a heart attack. How is PCI done? PCI is done in a cardiac catheterization laboratory (\"cath lab\"). It is done by a heart specialist called a cardiologist. The whole procedure may take 1½ to 3 hours. You lie on a table under a large X-ray machine.  You will get medicine through an IV in one of your veins. It helps you relax and not feel pain. You will be awake during the procedure. But you may not be able to remember much about it. The doctor will inject some medicine into your arm or groin to numb the skin. You will feel a small needle stick. It's like having a blood test. You may feel some pressure when the doctor puts in the catheter. But you will not feel pain. The doctor will look at X-ray pictures on a monitor (like a TV set) to move the catheter to your heart. You may feel warm or flushed for a short time when the doctor injects dye into your artery. The doctor then will inflate a tiny balloon at the end of the catheter. The balloon is inflated for a brief time. Then it is deflated and removed. The doctor also may use the catheter to put a stent in the artery. What can you expect after PCI? The catheter will be removed. A nurse or doctor may press on a bandage on the opening. Then a bandage or a compression device may be placed on your groin or wrist at the catheter insertion site. This prevents bleeding. After the test, you will be taken to a room where the catheter site and your heart rate, blood pressure, and temperature will be checked several times. If the catheter was put in your groin, you will need to lie still and keep your leg straight for several hours. If the catheter was put in your arm, you may be able to sit up and get out of bed right away. But you will need to keep your arm still for at least one hour. You may stay 1 night in the hospital. When you go home, you will get instructions from your doctor to help you recover well and prevent problems. Make sure to drink plenty of fluids (unless your doctor tells you not to) for several hours after the test. This will help flush the dye out of your body. Your doctor will prescribe blood-thinning medicines. You will likely take aspirin plus another blood thinner.  It is very important that you take these medicines exactly as directed. They help keep the coronary artery open and reduce your risk of a heart attack. If you have this procedure, you will still need to make lifestyle changes like eating healthy, being active, and not smoking. This will give you the best chance for a longer, healthier life. Follow-up care is a key part of your treatment and safety. Be sure to make and go to all appointments, and call your doctor if you are having problems. It's also a good idea to know your test results and keep a list of the medicines you take. Where can you learn more? Go to http://ralph-paige.info/. Enter A258 in the search box to learn more about \"Learning About Percutaneous Coronary Intervention. \"  Current as of: April 9, 2019  Content Version: 12.2  © 3759-0092 [employer]. Care instructions adapted under license by [employer] (which disclaims liability or warranty for this information). If you have questions about a medical condition or this instruction, always ask your healthcare professional. [place] [address] any warranty or liability for your use of this information. Taking Aspirin and Other Antiplatelets Safely: Care Instructions  Your Care Instructions    Aspirin and other antiplatelet medicines help prevent blood clots from forming. They can help some people lower their risk of a heart attack or stroke. But these medicines can also make you more likely to bleed. That's why it's important to talk to your doctor before you start taking aspirin every day. It's not right for everyone. And if you and your doctor decide these medicines are right for you, learn how to take them safely. If you take aspirin, be sure you know how to take it. Your doctor can tell you what dose to take and how often to take it. One low-dose aspirin is 81 milligrams (mg). But the dose for daily aspirin can range from 81 mg to 325 mg.  If you take another antiplatelet, take it as prescribed. Follow-up care is a key part of your treatment and safety. Be sure to make and go to all appointments, and call your doctor if you are having problems. It's also a good idea to know your test results and keep a list of the medicines you take. How can you care for yourself at home? · Before you start to take daily aspirin or some other antiplatelet, tell your doctor all the medicines, vitamins, herbal products, and supplements you take. · Tell your doctors, dentist, and pharmacist that you take an antiplatelet. · Take your medicine as your doctor directs. Make sure that you understand exactly what your doctor wants you to do. If another doctor says to stop taking the medicine for any reason, talk to the doctor who prescribed it before you stop. · Take your medicine at the same time every day. · Do not chew or crush the coated or time-release forms of your medicine. · If you miss a dose, don't take an extra dose to make up for it. · Ask your doctor whether you can drink alcohol. And ask how much you can drink. When you take an antiplatelet, drinking too much raises your risk for liver damage and stomach bleeding. · If you are pregnant, are breastfeeding, or plan to become pregnant, talk to your doctor about what medicines are safe. · Talk with your doctor before you take a pain medicine. Many pain medicines have aspirin. Too much aspirin can be harmful. · Wear medical alert jewelry. This lets others know that you take an antiplatelet. You can buy it at most drugstores. · Try to avoid injuries that might make you bleed. For example, be careful when you exercise and when you play sports. Make your home safe to reduce your risk of falling. When should you call for help? Call 911 anytime you think you may need emergency care.  For example, call if:    · You have a sudden, severe headache that is different from past headaches.    Call your doctor now or seek immediate medical care if:    · You have any abnormal bleeding, such as:  ? A nosebleed that you can't easily stop. ? Bloody or black stools, or rectal bleeding. ? Bloody or pink urine.     · You feel dizzy or lightheaded or feel like you may faint.    Watch closely for changes in your health, and be sure to contact your doctor if you have any problems. Where can you learn more? Go to http://ralph-paige.info/. Enter R678 in the search box to learn more about \"Taking Aspirin and Other Antiplatelets Safely: Care Instructions. \"  Current as of: April 9, 2019  Content Version: 12.2  © 9073-6838 [employer]. Care instructions adapted under license by [employer] (which disclaims liability or warranty for this information). If you have questions about a medical condition or this instruction, always ask your healthcare professional. [address] any warranty or liability for your use of this information. Patient Education        Reducing Heart Attack Risk With Daily Medicine: Care Instructions  Your Care Instructions    If you are at risk for a heart attack, there are many medicines that can reduce your risk. These include:  · ACE inhibitors or ARBs. These are types of blood pressure medicines. They can reduce the risk of heart attacks and strokes if you are at high risk. · Statins and other cholesterol medicines. These lower cholesterol. They can also reduce the risk of a stroke. · Aspirin and other antiplatelets. If you are at high risk of a heart attack or stroke and at low risk of bleeding problems, your doctor might talk to you about taking an aspirin every day to lower your risk. Only take daily aspirin if your doctor recommends it because it's not right for everybody. · Beta-blocker medicines. These are a type of blood pressure and heart medicine. They can reduce the chance of early death if you have had a heart attack. All medicines can cause side effects.  So it is important to understand the pros and cons of any medicine you take. It is also important to take your medicines exactly as your doctor tells you to. Follow-up care is a key part of your treatment and safety. Be sure to make and go to all appointments, and call your doctor if you are having problems. It's also a good idea to know your test results and keep a list of the medicines you take. ACE inhibitors  ACE (angiotensin-converting enzyme) inhibitors are used for three main reasons. They lower blood pressure, protect the kidneys, and prevent heart attacks and strokes. Examples include benazepril (Lotensin), lisinopril (Prinivil, Zestril), and ramipril (Altace). An angiotensin II receptor blocker (ARB) may be used instead of an ACE inhibitor. ARBs help you in the same ways as ACE inhibitors. Examples include candesartan (Atacand), irbesartan (Avapro), and losartan (Cozaar). Before you start taking an ACE inhibitor or an ARB, make sure your doctor knows if:  · You are taking a water pill (diuretic). · You are taking potassium pills or using salt substitutes. · You are pregnant or breastfeeding. · You have had a kidney transplant or other kidney problems. ACE inhibitors and ARBs can cause side effects. Call your doctor right away if you have:  · Trouble breathing. · Swelling in your face, head, neck, or tongue. · Dizziness or lightheadedness. · A dry cough. Statins  Statins lower cholesterol. Examples include atorvastatin (Lipitor), lovastatin (Mevacor), pravastatin (Pravachol), and simvastatin (Zocor). Before you start taking a statin, make sure your doctor knows if:  · You have had a kidney transplant or other kidney problems. · You have liver disease. · You take any other prescription medicine, over-the-counter medicine, vitamins, supplements, or herbal remedies. · You are pregnant or breastfeeding. Statins can cause side effects. Call your doctor right away if you have:  · New, severe muscle aches.   · Abraham Spies urine. Aspirin  If you are at high risk of a heart attack, your doctor might talk to you about taking an aspirin every day to lower your risk. A heart attack occurs when a blood vessel in the heart gets blocked. When this happens, oxygen can't get to the heart muscle, and part of the heart dies. Aspirin can help prevent blood clots that can block the blood vessels. But talk to your doctor before you start taking aspirin every day. Daily aspirin isn't right for most people. This is because it can cause serious bleeding. You and your doctor can decide if aspirin is a good choice for you based on your risk of a heart attack and your risk of serious bleeding. You may not be able to use aspirin if you:  · Have asthma or certain other health conditions. · Have an ulcer or other stomach problem. · Take some other medicine (called a blood thinner) that prevents blood clots. · Are allergic to aspirin. Your doctor may recommend that you take one low-dose aspirin (81 mg) tablet each day, with a meal and a full glass of water. Before having a surgery or procedure, tell your doctor or dentist that you take aspirin. He or she will tell you if you should stop taking aspirin beforehand. Make sure that you understand exactly what your doctor wants you to do. Aspirin can cause side effects. Call your doctor right away if you have:  · Unusual bleeding or bruising. · Nausea, vomiting, or heartburn. · Black or bloody stools. Beta-blockers  Beta-blockers are used for three main reasons. They lower blood pressure, relieve angina symptoms (such as chest pain or pressure), and reduce the chances of a second heart attack. They include atenolol (Tenormin), carvedilol (Coreg), and metoprolol (Lopressor). Before you start taking a beta-blocker, make sure your doctor knows if you have:  · Severe asthma or frequent asthma attacks. · A very slow pulse (less than 55 beats a minute). Beta-blockers can cause side effects.  Call your doctor right away if you have:  · Wheezing or trouble breathing. · Dizziness or lightheadedness. · Asthma that gets worse. When should you call for help? Watch closely for changes in your health, and be sure to contact your doctor if you have any problems. Where can you learn more? Go to http://ralph-paige.info/. Enter R428 in the search box to learn more about \"Reducing Heart Attack Risk With Daily Medicine: Care Instructions. \"  Current as of: April 9, 2019  Content Version: 12.2  © 4572-7305 [email or web address]. Care instructions adapted under license by [employer] (which disclaims liability or warranty for this information). If you have questions about a medical condition or this instruction, always ask your healthcare professional. [address] any warranty or liability for your use of this information. Patient Education        Heart Attack: Care Instructions  Your Care Instructions    A heart attack (myocardial infarction, or MI) occurs when one or more of the coronary arteries, which supply the heart with oxygen-rich blood, is blocked. A blockage usually occurs when plaque inside the artery breaks open and a blood clot forms in the artery. After a heart attack, you may be worried about your future. Over the next several weeks, your heart will start to heal. Though it can be hard to break old habits, you can prevent another heart attack by making some lifestyle changes and by taking medicines. You may use this information for ideas about what to do at home to speed your recovery. Follow-up care is a key part of your treatment and safety. Be sure to make and go to all appointments, and call your doctor if you are having problems. It's also a good idea to know your test results and keep a list of the medicines you take. How can you care for yourself at home? Activity    · Until your doctor says it is okay, do not do strenuous exercise. And do not lift, pull, or push anything heavy. Ask your doctor what types of activities are safe for you.     · If your doctor has not set you up with a cardiac rehabilitation (rehab) program, talk to him or her about whether that is right for you. Cardiac rehab includes supervised exercise. It also includes help with diet and lifestyle changes and emotional support. It may reduce your risk of future heart problems.     · Increase your activities slowly. Take short rest breaks when you get tired.     · If your doctor recommends it, get more exercise. Walking is a good choice. Bit by bit, increase the amount you walk every day. Try for at least 30 minutes on most days of the week. You also may want to swim, bike, or do other activities. Talk with your doctor before you start an exercise program to make sure it is safe for you.     · Ask your doctor when you can drive, go back to work, and do other daily activities again.     · You can have sex as soon as you feel ready for it. Often this means when you can easily walk around or climb stairs. Talk with your doctor if you have any concerns. If you are taking nitroglycerin, do not take erection-enhancing medicine such as sildenafil (Viagra), tadalafil (Cialis), or vardenafil (Levitra) .    Lifestyle changes    · Do not smoke. Smoking increases your risk of another heart attack. If you need help quitting, talk to your doctor about stop-smoking programs and medicines. These can increase your chances of quitting for good.     · Eat a heart-healthy diet that is low in saturated fat and salt, and is full of fruits, vegetables and whole-grains. Eat at least two servings of fish each week. You may get more details about how to eat healthy. But these tips can help you get started.     · Stay at a healthy weight, or lose weight if you need to. Medicines    · Be safe with medicines. Take your medicines exactly as prescribed.  Call your doctor if you think you are having a problem with your medicine. You will get more details on the specific medicines your doctor prescribes. Do not stop taking your medicine unless your doctor tells you to. Not taking your medicine might raise your risk of having another heart attack.     · You may need several medicines to help lower your risk of another heart attack. These include:  ? Blood pressure medicines such as angiotensin-converting enzyme (ACE) inhibitors, ARBs (angiotensin II receptor blockers), and beta-blockers. ? Cholesterol medicine called statins. ? Aspirin and other blood thinners. These prevent blood clots that can cause a heart attack.     · If your doctor has given you nitroglycerin, keep it with you at all times. If you have angina symptoms, such as chest pain or pressure, sit down and rest. Take the first dose of nitroglycerin as directed. If symptoms get worse or are not getting better within 5 minutes, call 911 right away. Stay on the phone. The emergency  will tell you what to do.     · Do not take any over-the-counter medicines, vitamins, or herbal products without talking to your doctor first.    Staying healthy    · Manage other health conditions such as high blood pressure and diabetes.     · Avoid colds and flu. Get a pneumococcal vaccine shot. If you have had one before, ask your doctor whether you need another dose. Get the flu vaccine every year. If you must be around people with colds or flu, wash your hands often.     · Be sure to tell your doctor about any angina symptoms you have had, even if they went away. Pay attention to your angina symptoms. Know what is typical for you and learn how to control it. Know when to call for help.     · Talk to your family, friends, or a counselor about your feelings. It is normal to feel frightened, angry, hopeless, helpless, and even guilty. Talking openly about bad feelings can help you cope. If you have symptoms of depression, talk to your doctor.    When should you call for help? Call 911 anytime you think you may need emergency care. For example, call if:    · You have symptoms of a heart attack. These may include:  ? Chest pain or pressure, or a strange feeling in the chest.  ? Sweating. ? Shortness of breath. ? Nausea or vomiting. ? Pain, pressure, or a strange feeling in the back, neck, jaw, or upper belly or in one or both shoulders or arms. ? Lightheadedness or sudden weakness. ? A fast or irregular heartbeat. After you call 911, the  may tell you to chew 1 adult-strength or 2 to 4 low-dose aspirin. Wait for an ambulance. Do not try to drive yourself.     · You have angina symptoms (such as chest pain or pressure) that do not go away with rest or are not getting better within 5 minutes after you take a dose of nitroglycerin.     · You passed out (lost consciousness).     · You feel like you are having another heart attack.    Call your doctor now or seek immediate medical care if:    · You are having angina symptoms, such as chest pain or pressure, more often than usual, or the symptoms are different or worse than usual.     · You have new or increased shortness of breath.     · You are dizzy or lightheaded, or you feel like you may faint.    Watch closely for changes in your health, and be sure to contact your doctor if you have any problems. Where can you learn more? Go to http://ralph-paige.info/. Enter [id number] in the search box to learn more about \"Heart Attack: Care Instructions. \"  Current as of: April 9, 2019  Content Version: 12.2  © 7080-6058 [employer]. Care instructions adapted under license by [employer] (which disclaims liability or warranty for this information). If you have questions about a medical condition or this instruction, always ask your healthcare professional. [place] [address] any warranty or liability for your use of this information.          Patient Education Percutaneous Coronary Intervention: What to Expect at [facility]    Percutaneous coronary intervention (PCI) is the name for procedures that are used to open a narrowed or blocked coronary artery. The two most common PCI procedures are coronary angioplasty and coronary stent placement. Your groin or arm may have a bruise and feel sore for a day or two after a percutaneous coronary intervention (PCI). You can do light activities around the house, but nothing strenuous for several days. This care sheet gives you a general idea about how long it will take for you to recover. But each person recovers at a different pace. Follow the steps below to get better as quickly as possible. How can you care for yourself at home? Activity    · If the doctor gave you a sedative:  ? For 24 hours, don't do anything that requires attention to detail, such as going to work, making important decisions, or signing any legal documents. It takes time for the medicine's effects to completely wear off.  ? For your safety, do not drive or operate any machinery that could be dangerous. Wait until the medicine wears off and you can think clearly and react easily.     · Do not do strenuous exercise and do not lift, pull, or push anything heavy until your doctor says it is okay. This may be for a day or two. You can walk around the house and do light activity, such as cooking.     · If the catheter was placed in your groin, try not to walk up stairs for the first couple of days.     · If the catheter was placed in your arm near your wrist, do not bend your wrist deeply for the first couple of days. Be careful using your hand to get into and out of a chair or bed.     · Carry your stent identification card with you at all times.     · If your doctor recommends it, get more exercise. Walking is a good choice. Bit by bit, increase the amount you walk every day. Try for at least 30 minutes on most days of the week.    Diet    · Drink plenty of fluids to help your body flush out the dye. If you have kidney, heart, or liver disease and have to limit fluids, talk with your doctor before you increase the amount of fluids you drink.     · Keep eating a heart-healthy diet that has lots of fruits, vegetables, and whole grains. If you have not been eating this way, talk to your doctor. You also may want to talk to a dietitian. This expert can help you to learn about healthy foods and plan meals. Medicines    · Your doctor will tell you if and when you can restart your medicines. He or she will also give you instructions about taking any new medicines.     · If you take blood thinners, such as warfarin (Coumadin), clopidogrel (Plavix), or aspirin, be sure to talk to your doctor. He or she will tell you if and when to start taking those medicines again. Make sure that you understand exactly what your doctor wants you to do.     · Your doctor will prescribe blood-thinning medicines. You will likely take aspirin plus another antiplatelet, such as clopidogrel (Plavix). It is very important that you take these medicines exactly as directed. These medicines help keep the coronary artery open and reduce your risk of a heart attack.     · Call your doctor if you think you are having a problem with your medicine.    Care of the catheter site    · For 1 or 2 days, keep a bandage over the spot where the catheter was inserted. The bandage probably will fall off in this time.     · Put ice or a cold pack on the area for 10 to 20 minutes at a time to help with soreness or swelling. Put a thin cloth between the ice and your skin.     · You may shower 24 to 48 hours after the procedure, if your doctor okays it. Pat the incision dry.     · Do not soak the catheter site until it is healed. Don't take a bath for 1 week, or until your doctor tells you it is okay.     · Watch for bleeding from the site.  A small amount of blood (up to the size of a quarter) on the bandage can be normal.     · If you are bleeding, lie down and press on the area for 15 minutes to try to make it stop. If the bleeding does not stop, call your doctor or seek immediate medical care. Follow-up care is a key part of your treatment and safety. Be sure to make and go to all appointments, and call your doctor if you are having problems. It's also a good idea to know your test results and keep a list of the medicines you take. When should you call for help? Call 911 anytime you think you may need emergency care. For example, call if:    · You passed out (lost consciousness).     · You have severe trouble breathing.     · You have sudden chest pain and shortness of breath, or you cough up blood.     · You have symptoms of a heart attack, such as:  ? Chest pain or pressure. ? Sweating. ? Shortness of breath. ? Nausea or vomiting. ? Pain that spreads from the chest to the neck, jaw, or one or both shoulders or arms. ? Dizziness or lightheadedness. ? A fast or uneven pulse. After calling 911, chew 1 adult-strength aspirin. Wait for an ambulance. Do not try to drive yourself.     · You have been diagnosed with angina, and you have angina symptoms that do not go away with rest or are not getting better within 5 minutes after you take one dose of nitroglycerin.    Call your doctor now or seek immediate medical care if:    · You are bleeding from the area where the catheter was put in your artery.     · You have a fast-growing, painful lump at the catheter site.     · You have signs of infection, such as:  ? Increased pain, swelling, warmth, or redness. ? Red streaks leading from the catheter site. ? Pus draining from the catheter site. ? A fever.     · Your leg, arm, or hand is painful, looks blue, or feels cold, numb, or tingly.    Watch closely for changes in your health, and be sure to contact your doctor if you have any problems. Where can you learn more?   Go to http://ralph-paige.info/. Enter Y100 in the search box to learn more about \"Percutaneous Coronary Intervention: What to Expect at Home. \"  Current as of: April 9, 2019  Content Version: 12.2  © 4497-1298 [employer]. Care instructions adapted under license by [employer] (which disclaims liability or warranty for this information). If you have questions about a medical condition or this instruction, always ask your healthcare professional. [address] any warranty or liability for your use of this information. Learning About Cardiac Rehabilitation  What is it? Cardiac rehabilitation (rehab) is a program for people who have a heart problem. It teaches you how to be more active and make other lifestyle changes. These can lead to a stronger heart and better health. Why is cardiac rehab done? Cardiac rehab can help you get better after being in the hospital for a heart problem or heart surgery. It may help you to:  · Lower your risk of a heart attack or dying from heart disease. · Prevent future heart problems if you're at high risk for heart disease or a heart attack. · Stay out of the hospital.  · Build your strength. This can help increase the amount of activity you can do. · Manage your symptoms. These may include chest pain, shortness of breath, and fatigue. · Manage other health problems. These include high blood pressure and high cholesterol. Other benefits  Rehab may also help you to:  · Go back to work safely and sooner. · Feel more hopeful. You may feel less depressed, stressed, or worried. · Get support from your rehab team and other patients in rehab. · Have more energy and return to your usual activities. · Resume your sex life. · Have a heart-healthy lifestyle. This includes being active, eating healthy foods, losing weight, and not smoking. What are some types of cardiac rehab?   Cardiac rehab programs can be done in an office, a clinic, or your home. These programs usually include:  · Close supervision. This happens during the early part of your exercise program.  · Education and counseling for you and your family. This can help you build healthy habits. These habits will lower your risk of having more heart problems. · Helping you prepare to get back to work and the activities you enjoyed before your heart problems. You may need to adjust your work or leisure activities. · Taking care of your emotional health. You can get help for depression and improve your emotional well-being. · Making a plan to help you start a safe exercise program at home. What does a cardiac rehab team do? In cardiac rehab, you work with a team of health professionals. The team may include a doctor, a nurse specialist, a dietitian, an exercise therapist, and a physical therapist. They design a program just for you, based on your health and goals. They also give you support to help you succeed. What happens before you start cardiac rehab? Before you start cardiac rehab, your doctor will check your heart health to see what types of exercises you can safely do. Tests may include electrocardiograms and echocardiograms. You may also have tests during rehab. They will help your doctor see how you are doing. Where can you learn more? Go to http://ralph-paige.info/. Enter 96 204285 in the search box to learn more about \"Learning About Cardiac Rehabilitation. \"  Current as of: April 9, 2019  Content Version: 12.2  © 5795-6675 [employer]. Care instructions adapted under license by [employer] (which disclaims liability or warranty for this information). If you have questions about a medical condition or this instruction, always ask your healthcare professional. [address] any warranty or liability for your use of this information.          Learning About Screening for Heart Attack and Stroke Risk  What is screening for heart attack and stroke risk? Screening for heart attack and stroke risk is a way for your doctor to check your chance of having a problem called atherosclerosis. This problem is also called hardening of the arteries. It is the starting point for most heart and blood flow problems, such as heart disease, stroke, and peripheral arterial disease. You and your doctor can use your risk score to decide if you want to take steps to lower your risk. How can you find out your risk? Your doctor looks at things that put you at risk for a heart attack and stroke. He or she might look at many things, such as:  · Your cholesterol levels. · Your blood pressure. · Your age. · Your race. · Whether you are male or female. · Whether or not you smoke. Your doctor might use a tool to calculate a risk score for you. There are different tools that doctors use. They may show that your risk is higher or lower than it really is. But the tools give you and your doctor a good idea about your risk. What happens after screening? Knowing your risk can help you and your doctor talk about whether to take steps to lower your risk. A heart-healthy lifestyle is important for everyone. Some people also take medicine to lower their risk. You and your doctor can work together to decide what is best for you. Where can you learn more? Go to http://ralph-paige.info/. Enter N363 in the search box to learn more about \"Learning About Screening for Heart Attack and Stroke Risk. \"  Current as of: April 9, 2019  Content Version: 12.2  © 3570-9601 [employer]. Care instructions adapted under license by [employer] (which disclaims liability or warranty for this information).  If you have questions about a medical condition or this instruction, always ask your healthcare professional. [place] [address] any warranty or liability for your use of this information.

## 2019-11-27 NOTE — PROGRESS NOTES
Patient presented with discharge instructions. Patient has follow up appointment with Dr. Aleksander Torrez, Cardiologist, and PCP. Patient demonstrated understanding of s/s of MI and Stroke. Prescription medication filled with assistance from the care manager. Patient denies any pain, CP, or SOB.

## 2019-11-27 NOTE — PROGRESS NOTES
D/c noted for today, patient has family support and will return home. Indigent medications sent to outpatient pharmacy.        ARIADNE Pritchard  Case Management  [phone]

## 2019-11-27 NOTE — PROGRESS NOTES
Problem: Falls - Risk of  Goal: *Absence of Falls  Description  Document Gustavoata Carrel Fall Risk and appropriate interventions in the flowsheet. Outcome: Progressing Towards Goal  Note: Fall Risk Interventions:  Mobility Interventions: Communicate number of staff needed for ambulation/transfer, Patient to call before getting OOB, Utilize walker, cane, or other assistive device         Medication Interventions: Patient to call before getting OOB, Teach patient to arise slowly    Elimination Interventions: Call light in reach, Patient to call for help with toileting needs, Toileting schedule/hourly rounds              Problem: Pressure Injury - Risk of  Goal: *Prevention of pressure injury  Description  Document Angel Luis Scale and appropriate interventions in the flowsheet.   Outcome: Progressing Towards Goal  Note: Pressure Injury Interventions:  Sensory Interventions: Assess changes in LOC, Assess need for specialty bed, Float heels, Keep linens dry and wrinkle-free, Maintain/enhance activity level, Minimize linen layers, Monitor skin under medical devices    Moisture Interventions: Absorbent underpads    Activity Interventions: Chair cushion, Pressure redistribution bed/mattress(bed type), PT/OT evaluation    Mobility Interventions: Assess need for specialty bed, Pressure redistribution bed/mattress (bed type), PT/OT evaluation    Nutrition Interventions: Document food/fluid/supplement intake, Offer support with meals,snacks and hydration

## 2019-11-27 NOTE — PROGRESS NOTES
To whom it may concern,     Please excuse Faustino Andrdae from work on 11/26/19. [facility]. Thank you,     YANDEL Schreiber DO   November 27, 2019 9:45 AM

## 2019-11-27 NOTE — DISCHARGE SUMMARY
Discharge Summary      Patient: Reubin Dance MRN: [id number]  CSN: [id number]    YOB: 1965  Age: 62 y.o. Sex: female    DOA: 11/26/2019 LOS:  LOS: 1 day   Discharge Date: 11/27/19      Admission Diagnoses: STEMI (ST elevation myocardial infarction) ([facility] [address] [I21.3]    Discharge Diagnoses:    Inferior STEMI   CAD   R groin hematoma   HTN  HLD   Hx Type II DM   Hypothyroidism   Obesity   GERD   Former smoker     Discharge Condition: Stable    PHYSICAL EXAM  Visit Vitals  /54 (BP 1 Location: Left arm, BP Patient Position: At rest;Head of bed elevated (Comment degrees))   Pulse 76   Temp 97.7 °F (36.5 °C)   Resp 13   Ht 5' 4\" (1.626 m)   Wt 99.8 kg (220 lb)   SpO2 92%   Breastfeeding No   BMI 37.76 kg/m²       General: Alert, cooperative, no acute distress    HEENT: NC, Atraumatic. EOMI. Anicteric sclerae. Lungs:  CTA Bilaterally. No Wheezing/Rhonchi/Rales. Heart:  Regular  rhythm,  No murmur, No Rubs, No Gallops  Abdomen: Soft, Non distended, Non tender. +Bowel sounds, no HSM  Extremities: No c/c/e  Psych:   Good insight. Not anxious or agitated. Neurologic:  Alert and oriented X 3. No acute neurological deficits. Hospital Course:   Reubin Dance is a 62 y.o. female with a PMHx of HTN, HLD, hypothyroidism and Type II DM who presented to the ED with complaints of a few day history of jaw pain intermittently radiating into the L chest and arm. An EKG in the ED showed ST elevations in leads II, III and aVF. Cardiology was consulted and she was taken immediately to the cath lab. A L heart cath was done and SARAH was placed for 100% occluded RCA. Ms. Michelle Velásquez was taken to recovery where a large hematoma was noted to the R groin. An arterial US was done and showed no pseudoaneurysm. She was then taken to CVT ICU. An Echo was done that afternoon and showed and EF of 61-65% with no regional wma. She was started on ASA, atorvastatin, metoprolol and brilinta.  Over the next 24 hours, Ms. Michelle Velásquez remained hemodynamically stable. She was then discharged in stable condition to home with instructions to follow-up with her cardiologist in 2-3 weeks and her PCP. Outpatient pharmacy was able to provide assistance for her new prescription for brilinta. Consults:   Cardiology- Dr. Marito Reich MD     Significant Diagnostic Studies:     L Heart Cath 11/26/19:     Diagnostic   Dominance: Right   Left Main   The vessel is angiographically normal.   Left Anterior Descending   The vessel exhibits minimal luminal irregularities. Left Circumflex   Proximalmid 95-99% tubular stenosis before bifurcating to OM branches. Status post PCI and stenting of LCx using 2.75 x 12 mm Xience drug-eluting stent   Prox Cx to Mid Cx lesion 95% stenosed. . Lesion is the culprit lesion. There is severe plaque burden detected. Right Coronary Artery   Proximal diffuse up to 90% disease followed by mid vessel 100% occlusion with evidence of homocollateral supplying distal branch. There was also evidence of left to right collateral supplying distal RCA. Intervention   Prox Cx to Mid Cx lesion   Angioplasty   Angioplasty using a standard balloon was performed prior to stent deployment. The balloon used was a CATH BLLN RX TREK [id number] -- . Balloon inflated using single inflation technique. Stent   A single stent was placed. Drug-eluting stent was successfully placed. The stent used was a STENT SYS COR [id number] -- XIENCE IRVING. Angioplasty   Angioplasty using a standard balloon was performed following stent deployment. The balloon used was a CATH BLLN COR DIL [id number] -- NC TREK RAPID-EXCHANGE. Balloon inflated using single inflation technique. Post-Intervention Lesion Assessment   The intervention was successful. The guidewire crossed the lesion. The pre-interventional distal flow is normal (MARK 3). Post-intervention MARK flow is 3. There were no complications. There is a 0% residual stenosis post intervention.        Duplex Lower Ext Artery R 11/26/19: No evidence of pseudoaneurysm in the right groin. Echo 11/26/19: Technically difficult study with poor endocardial visualization. Definity contrast was given to enhance imaging. Left Ventricle: Normal cavity size, wall thickness, systolic function (ejection fraction normal) and diastolic function. Estimated left ventricular ejection fraction is 61 - 65%. Visually measured ejection fraction. No regional wall motion abnormality noted. Discharge Medications:  Current Discharge Medication List      START taking these medications    Details   aspirin 81 mg chewable tablet Take 1 Tab by mouth daily. Qty: 100 Tab, Refills: 1      atorvastatin (LIPITOR) 80 mg tablet Take 1 Tab by mouth nightly. Qty: 90 Tab, Refills: 1      metoprolol tartrate (LOPRESSOR) 25 mg tablet Take 1 Tab by mouth every twelve (12) hours. Qty: 60 Tab, Refills: 3      !! ticagrelor (BRILINTA) 90 mg tablet Take 1 Tab by mouth two (2) times a day. Qty: 60 Tab, Refills: 0  1st month      !! ticagrelor (BRILINTA) 60 mg tab tablet Take 1 Tab by mouth two (2) times a day. Qty: 120 Tab, Refills: 0  2nd and 3rd month       !! - Potential duplicate medications found. Please discuss with provider. CONTINUE these medications which have CHANGED    Details   levothyroxine (SYNTHROID) 150 mcg tablet Take 1 Tab by mouth Daily (before breakfast). Qty: 30 Tab, Refills: 4    Associated Diagnoses: Acquired hypothyroidism      Omeprazole delayed release (PRILOSEC D/R) 20 mg tablet Take 1 Tab by mouth daily. Qty: 30 Tab, Refills: 0         STOP taking these medications       albuterol (PROVENTIL HFA, VENTOLIN HFA, PROAIR HFA) 90 mcg/actuation inhaler Comments:   Reason for Stopping:         amLODIPine-atorvastatin (CADUET) 5-20 mg per tablet Comments:   Reason for Stopping:                Activity: No heavy lifting for 3 weeks. Excused from work for 2 weeks.      Diet: Cardiac         Follow-up Information     Follow up With Specialties Details Why Contact Info    Jan Mcginnis MD Cardiology, Internal Medicine Schedule an appointment as soon as possible for a visit in 2 weeks F/U  [facility]  [facility] [id number] [facility]      Diane Saeed NP Nurse Practitioner Schedule an appointment as soon as possible for a visit in 1 week  [address] [facility] [address]  Sae f/u             Minutes spent on discharge: >30 minutes spent coordinating this discharge (review instructions/follow-up, prescriptions, preparing report for sign off)          YANDEL Strange,    November 27, 2019

## 2019-11-27 NOTE — PROGRESS NOTES
To whom it may concern,     Please excuse [employer] form work on 11/26/19. She was at [facility] with her mother for emergent medical care. Thank you,       YANDEL Hdz, DO   November 27, 2019 9:47 AM

## 2019-11-27 NOTE — PROGRESS NOTES
To whom it may concern,     Please excuse Ms. Solitario Tirado from work from 11/25/19 through 12/9/19. She has required emergent medical care at [facility] and should be excused from work until 12/10/19. She may return to light duty after that. Additionally there should be no heavy lifting until at least 12/17/19. Thank you,         YANDEL Fisher, DO   November 27, 2019

## 2019-11-27 NOTE — PROGRESS NOTES
Shift Summary:  Uneventful shift. Pt A/O x4, ambulates self to bathroom. Urine recorded with hat. NSR and BP WNL. Clear on RA, no SOB or chest pain. Right groin to labia bruised, soft and outlined, no complains to pain in area. Children at bedside last night. Requesting note from MD stating their mother was in the hospital. Mercedes Bell will pass along to morning nurse as the MD/Cardiologist will be in the morning. Recliner prepared to sit up in the morning and items provided for bath. Sleeping this morning and not ready to get up as of yet. Pt is self care and advised to call when ready to assist with cords and gown.

## 2019-11-27 NOTE — PROGRESS NOTES
[facility]  -  Progress Note      Patient: Ela Griffin MRN: [id number]  SSN: [id number]    YOB: 1965  Age: 62 y.o. Sex: female      Admit Date: 11/26/2019    Assessment:     -s/p inferior STEMI 11/26/19, s/p LCx stent, % occluded, suspect chronic  -Echo 11/26/2019: EF 61-65%, no RWMA noted, technically difficult study  -Right groin hematoma  -Hypothyroidism  -h/o Hypertension  -Hyperlipidemia  -Obesity  -GERD  -Tobacco abuse disorder: Quit 6 months ago     No primary cardiologist    Plan:     -Continue medical management of CAD with ASA, Brilinta, Lipitor, Lopressor. Importance of medication compliance discussed with patient, especially DAPT, she expresses understanding. Brilinta should be filled by outpatient pharmacy prior to discharge later today.  -Pt should follow-up in the office within the next 3-4 weeks. Subjective:     No new complaints. Denies chest pain/shortness of breath.     Objective:      Patient Vitals for the past 8 hrs:   Temp Pulse Resp BP SpO2   11/27/19 0800 97.7 °F (36.5 °C) 76 13 109/54 92 %   11/27/19 0600  74 13 116/48 94 %   11/27/19 0500  86 16 101/50 94 %   11/27/19 0400 98.1 °F (36.7 °C) 77 15 90/50 92 %   11/27/19 0300  79 14 96/54 92 %   11/27/19 0203  90 15 124/60 96 %   11/27/19 0200  88 13  97 %   11/27/19 0100  78 14  94 %         Patient Vitals for the past 96 hrs:   Weight   11/26/19 1228 220 lb (99.8 kg)         Intake/Output Summary (Last 24 hours) at 11/27/2019 0858  Last data filed at 11/27/2019 0800  Gross per 24 hour   Intake 1762.5 ml   Output 4350 ml   Net -2587.5 ml       Physical Exam:  General:  alert, cooperative, no distress, appears stated age  Neck:  supple  Lungs:  clear to auscultation bilaterally  Heart:  Regular rate and rhythm  Abdomen:  abdomen is soft without significant tenderness, masses, organomegaly or guarding  Extremities:  Atraumatic, no edema     Data Review:     Labs: Results: Chemistry Recent Labs     11/27/19 0524 11/26/19 0330   * 110*    142   K 3.9 4.4    107   CO2 27 29   BUN 14 15   CREA 0.88 0.89   CA 8.3* 8.7   MG 2.2  --    AGAP 4 6   BUCR 16 17   AP 44* 60   TP 6.2* 8.2   ALB 3.2* 3.9   GLOB 3.0 4.3*   AGRAT 1.1 0.9      CBC w/Diff Recent Labs     11/27/19 0524 11/26/19 0330   WBC 7.9 8.2   RBC 4.09* 5.12   HGB 11.3* 14.5   HCT 36.0 44.6    300   GRANS 57 73   LYMPH 31 19*   EOS 5 4      Cardiac Enzymes Lab Results   Component Value Date/Time     11/27/2019 05:24 AM    CKMB 8.5 (H) 11/27/2019 05:24 AM    CKND1 5.0 (H) 11/27/2019 05:24 AM    TROIQ 5.53 (HH) 11/27/2019 05:24 AM      Coagulation Recent Labs     11/26/19 0330   PTP 11.8   INR 0.9   APTT 23.9       Lipid Panel Lab Results   Component Value Date/Time    Cholesterol, total 164 11/27/2019 05:24 AM    HDL Cholesterol 41 11/27/2019 05:24 AM    LDL, calculated 96.4 11/27/2019 05:24 AM    VLDL, calculated 26.6 11/27/2019 05:24 AM    Triglyceride 133 11/27/2019 05:24 AM    CHOL/HDL Ratio 4.0 11/27/2019 05:24 AM      Liver Enzymes Recent Labs     11/27/19 0524   TP 6.2*   ALB 3.2*   AP 44*   SGOT 37      Thyroid Studies Lab Results   Component Value Date/Time    TSH 0.83 11/27/2019 05:24 AM

## 2019-12-04 ENCOUNTER — OFFICE VISIT (OUTPATIENT)
Dept: FAMILY MEDICINE CLINIC | Age: 54
End: 2019-12-04

## 2019-12-04 VITALS
HEIGHT: 64 IN | BODY MASS INDEX: 37.32 KG/M2 | OXYGEN SATURATION: 98 % | SYSTOLIC BLOOD PRESSURE: 130 MMHG | RESPIRATION RATE: 16 BRPM | WEIGHT: 218.6 LBS | TEMPERATURE: 98.2 F | DIASTOLIC BLOOD PRESSURE: 72 MMHG | HEART RATE: 75 BPM

## 2019-12-04 DIAGNOSIS — Z09 HOSPITAL DISCHARGE FOLLOW-UP: Primary | ICD-10-CM

## 2019-12-04 DIAGNOSIS — I25.10 CAD S/P PERCUTANEOUS CORONARY ANGIOPLASTY: ICD-10-CM

## 2019-12-04 DIAGNOSIS — E03.9 ACQUIRED HYPOTHYROIDISM: ICD-10-CM

## 2019-12-04 DIAGNOSIS — Z98.61 CAD S/P PERCUTANEOUS CORONARY ANGIOPLASTY: ICD-10-CM

## 2019-12-04 DIAGNOSIS — I21.11 ST ELEVATION MYOCARDIAL INFARCTION INVOLVING RIGHT CORONARY ARTERY (HCC): ICD-10-CM

## 2019-12-04 DIAGNOSIS — Z87.891 FORMER SMOKER: ICD-10-CM

## 2019-12-04 RX ORDER — ATORVASTATIN CALCIUM 80 MG/1
80 TABLET, FILM COATED ORAL
Qty: 90 TAB | Refills: 1 | Status: SHIPPED | OUTPATIENT
Start: 2019-12-04 | End: 2020-05-20 | Stop reason: SDUPTHER

## 2019-12-04 RX ORDER — LEVOTHYROXINE SODIUM 150 UG/1
150 TABLET ORAL
Qty: 30 TAB | Refills: 4 | Status: SHIPPED | COMMUNITY
Start: 2019-12-04 | End: 2020-05-20 | Stop reason: SDUPTHER

## 2019-12-04 RX ORDER — METOPROLOL TARTRATE 25 MG/1
25 TABLET, FILM COATED ORAL EVERY 12 HOURS
Qty: 60 TAB | Refills: 3 | Status: SHIPPED | OUTPATIENT
Start: 2019-12-04 | End: 2020-05-20 | Stop reason: SDUPTHER

## 2019-12-04 RX ORDER — CLOPIDOGREL BISULFATE 75 MG/1
75 TABLET ORAL DAILY
Qty: 90 TAB | Refills: 3 | Status: SHIPPED | OUTPATIENT
Start: 2019-12-04 | End: 2020-10-30 | Stop reason: SDUPTHER

## 2019-12-04 NOTE — PATIENT INSTRUCTIONS
A Healthy Heart: Care Instructions Your Care Instructions Heart disease occurs when a substance called plaque builds up in the vessels that supply oxygen-rich blood to your heart. This can narrow the blood vessels and reduce blood flow. A heart attack happens when blood flow is completely blocked. A high-fat diet, smoking, and other factors increase the risk of heart disease. Your doctor has found that you have a chance of having heart disease. You can do lots of things to keep your heart healthy. It may not be easy, but you can change your diet, exercise more, and quit smoking. These steps really work to lower your chance of heart disease. Follow-up care is a key part of your treatment and safety. Be sure to make and go to all appointments, and call your doctor if you are having problems. It's also a good idea to know your test results and keep a list of the medicines you take. How can you care for yourself at home? Diet 
  · Use less salt when you cook and eat. This helps lower your blood pressure. Taste food before salting. Add only a little salt when you think you need it. With time, your taste buds will adjust to less salt.  
  · Eat fewer snack items, fast foods, canned soups, and other high-salt, high-fat, processed foods.  
  · Read food labels and try to avoid saturated and trans fats. They increase your risk of heart disease by raising cholesterol levels.  
  · Limit the amount of solid fat-butter, margarine, and shortening-you eat. Use olive, peanut, or canola oil when you cook. Bake, broil, and steam foods instead of frying them.  
  · Eating fish can lower your risk for heart disease. Eat at least 2 servings of fish a week. [place], mackerel, herring, sardines, and chunk light tuna are very good choices. These fish contain omega-3 fatty acids.  
  · Eat a variety of fruit and vegetables every day. Dark green, deep orange, red, or yellow fruits and vegetables are especially good for you. Examples include spinach, carrots, peaches, and berries.  
  · Foods high in fiber can reduce your cholesterol and provide important vitamins and minerals. High-fiber foods include whole-grain cereals and breads, oatmeal, beans, brown rice, citrus fruits, and apples.  
  · Limit drinks and foods with added sugar. These include candy, desserts, and soda pop.  
 Lifestyle changes 
  · If your doctor recommends it, get more exercise. Walking is a good choice. Bit by bit, increase the amount you walk every day. Try for at least 30 minutes on most days of the week. You also may want to swim, bike, or do other activities.  
  · Do not smoke. If you need help quitting, talk to your doctor about stop-smoking programs and medicines. These can increase your chances of quitting for good. Quitting smoking may be the most important step you can take to protect your heart. It is never too late to quit. You will get health benefits right away.  
  · Limit alcohol to 2 drinks a day for men and 1 drink a day for women. Too much alcohol can cause health problems. Medicines 
  · Take your medicines exactly as prescribed. Call your doctor if you think you are having a problem with your medicine.  
  · If your doctor recommends aspirin, take the amount directed each day. Make sure you take aspirin and not another kind of pain reliever, such as acetaminophen (Tylenol). If you take ibuprofen (such as Advil or Motrin) for other problems, take aspirin at least 2 hours before taking ibuprofen. When should you call for help? Call 911 if you have symptoms of a heart attack. These may include: 
  · Chest pain or pressure, or a strange feeling in the chest.  
  · Sweating.  
  · Shortness of breath.  
  · Pain, pressure, or a strange feeling in the back, neck, jaw, or upper belly or in one or both shoulders or arms.  
  · Lightheadedness or sudden weakness.  
  · A fast or irregular heartbeat.  After you call 911, the  may tell you to chew 1 adult-strength or 2 to 4 low-dose aspirin. Wait for an ambulance. Do not try to drive yourself. 
 Watch closely for changes in your health, and be sure to contact your doctor if you have any problems. Where can you learn more? Go to http://ralph-paige.info/. Enter C630 in the search box to learn more about \"A Healthy Heart: Care Instructions. \" Current as of: April 9, 2019 Content Version: 12.2 © 8111-4496 [employer]. Care instructions adapted under license by [employer] (which disclaims liability or warranty for this information). If you have questions about a medical condition or this instruction, always ask your healthcare professional. [address] any warranty or liability for your use of this information.

## 2019-12-04 NOTE — LETTER
12/4/2019 [facility] [address] [place], [address] [facility] Deidra, 1965, is picking up the following medications ordered from the [facility]: 
 
CADUET 5/20 MG #90 Jeniffer Herrera Patient's Signature: _____________________________ Today's Date: 12/4/2019
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
## 2019-12-10 ENCOUNTER — OFFICE VISIT (OUTPATIENT)
Dept: CARDIOLOGY CLINIC | Age: 54
End: 2019-12-10

## 2019-12-10 VITALS
OXYGEN SATURATION: 98 % | WEIGHT: 220 LBS | HEART RATE: 71 BPM | BODY MASS INDEX: 37.76 KG/M2 | DIASTOLIC BLOOD PRESSURE: 58 MMHG | SYSTOLIC BLOOD PRESSURE: 152 MMHG

## 2019-12-10 DIAGNOSIS — I25.10 CORONARY ARTERY DISEASE DUE TO LIPID RICH PLAQUE: Primary | ICD-10-CM

## 2019-12-10 DIAGNOSIS — I25.83 CORONARY ARTERY DISEASE DUE TO LIPID RICH PLAQUE: Primary | ICD-10-CM

## 2019-12-10 NOTE — PROGRESS NOTES
1. Have you been to the ER, urgent care clinic since your last visit? Hospitalized since your last visit? yes    2. Have you seen or consulted any other health care providers outside of the [address] [place] [address] since your last visit? Include any pap smears or colon screening.   No

## 2019-12-10 NOTE — PROGRESS NOTES
[facility]    Ms. Marlene Dukes is a 49-year-old female with history of coronary disease status post coronary stenting, diabetes, GERD, hypertension    Patient is here today for follow-up appointment. She was recently admitted to the hospital with inferior ST elevation MI. She underwent coronary stenting. Since she has gone home, she has been doing well. She was seen by primary care provider. She denies any chest pain or chest tightness to be concern of angina. She denies presyncope or syncope. She is taking her medication regularly. She denies PND or lower extremity swelling  Denies any nausea, vomiting, abdominal pain, fever, chills, sputum production. No hematuria or other bleeding complaints    Past Medical History:   Diagnosis Date    CAD (coronary artery disease)     Diabetes ([facility] [address]     Diabetes Type II reported by patient    GERD (gastroesophageal reflux disease)     Hx of abnormal cervical Pap smear     s/p hysterectomy     Hypercholesterolemia     Hypertension     STEMI (ST elevation myocardial infarction) ([facility] [address] 12/2019    Thyroid disease     hypothyroid         Past Surgical History:   Procedure Laterality Date    HX CARPAL TUNNEL RELEASE      right hand    HX GYN      partial hysterectomy    HX PARTIAL HYSTERECTOMY         Current Outpatient Medications   Medication Sig    ticagrelor (BRILINTA) 90 mg tablet Take  by mouth two (2) times a day.  levothyroxine (SYNTHROID) 150 mcg tablet Take 1 Tab by mouth Daily (before breakfast).  metoprolol tartrate (LOPRESSOR) 25 mg tablet Take 1 Tab by mouth every twelve (12) hours. Indications: EVAN    atorvastatin (LIPITOR) 80 mg tablet Take 1 Tab by mouth nightly. Indications: EVAN    Omeprazole delayed release (PRILOSEC D/R) 20 mg tablet Take 1 Tab by mouth daily.  aspirin 81 mg chewable tablet Take 1 Tab by mouth daily.     clopidogrel (PLAVIX) 75 mg tab Take 1 Tab by mouth daily. Indications: blood clot prevention following percutaneous coronary intervention, EVAN     No current facility-administered medications for this visit. Allergies and Sensitivities:  Allergies   Allergen Reactions    Tramadol Vertigo     Pt reported blacking out       Family History:  Family History   Problem Relation Age of Onset   [facility] Thyroid Disease Mother     Depression Mother     Hypertension Father     Heart Disease Father     Thyroid Disease Sister     Hypertension Sister     Diabetes Maternal Aunt     Diabetes Sister     Eczema Sister     No Known Problems Brother     Alzheimer Paternal Grandmother     Diabetes Paternal Grandmother        Social History:  Social History     Tobacco Use    Smoking status: Former Smoker     Packs/day: 1.00     Last attempt to quit: 2019     Years since quittin.5    Smokeless tobacco: Never Used   Substance Use Topics    Alcohol use: Yes     Frequency: Monthly or less     Drinks per session: 3 or 4     Binge frequency: Never     Comment: occasional on holidays    Drug use: No     She  reports that she quit smoking about 7 months ago. She smoked 1.00 pack per day. She has never used smokeless tobacco.  She  reports current alcohol use. Review of Systems:  Cardiac symptoms as noted above in HPI. All others negative. Denies fatigue, malaise, skin rash, joint pain, blurring vision, photophobia, neck pain, hemoptysis, chronic cough, nausea, vomiting, hematuria, burning micturition, BRBPR, chronic headaches. Physical Exam:  BP Readings from Last 3 Encounters:   12/10/19 152/58   19 130/72   19 138/70         Pulse Readings from Last 3 Encounters:   12/10/19 71   19 75   19 75          Wt Readings from Last 3 Encounters:   12/10/19 220 lb (99.8 kg)   19 218 lb 9.6 oz (99.2 kg)   19 220 lb (99.8 kg)       Constitutional: Oriented to person, place, and time.    HENT: Head: Normocephalic and atraumatic  Neck: No JVD present. Cardiovascular: Regular rhythm. No murmur, gallop or rubs appreciated  Lung: Breath sounds normal. No respiratory distress. No ronchi or rales appreciated  Abdominal: No tenderness. No rebound and no guarding. Musculoskeletal: There is no lower extremity edema. No cynosis  Lymphadenopathy:  No cervical or supraclavicular adenopathy appriciated. Review of Data  LABS:   Lab Results   Component Value Date/Time    Sodium 142 11/27/2019 05:24 AM    Potassium 3.9 11/27/2019 05:24 AM    Chloride 111 11/27/2019 05:24 AM    CO2 27 11/27/2019 05:24 AM    Glucose 104 (H) 11/27/2019 05:24 AM    BUN 14 11/27/2019 05:24 AM    Creatinine 0.88 11/27/2019 05:24 AM     Lipids Latest Ref Rng & Units 11/27/2019 9/12/2019 6/13/2019 6/7/2017 9/12/2016   Chol, Total <200 MG/ 178 294(H) 302(H) 242(H)   HDL 40 - 60 MG/DL 41 55 59 39(L) 48   LDL 0 - 100 MG/DL 96.4 97.2 203. 4(H) 211. 6(H) 166. 6(H)   Trig <150 MG/ 129 158(H) 257(H) 137   Chol/HDL Ratio 0 - 5.0   4.0 3.2 5.0 7.7(H) 5.0   Some recent data might be hidden     Lab Results   Component Value Date/Time    ALT (SGPT) 19 11/27/2019 05:24 AM     Lab Results   Component Value Date/Time    Hemoglobin A1c 5.7 (H) 11/27/2019 05:24 AM       EKG    ECHO (12/19)  Left Ventricle Normal cavity size, wall thickness, systolic function (ejection fraction normal) and diastolic function. The estimated ejection fraction is 61 - 65%. Visually measured ejection fraction. No regional wall motion abnormality noted. Left Atrium Normal cavity size. Left Atrium volume index is 25 mL/m2. Right Ventricle Not well visualized. Right Atrium Right atrium not well visualized. Aortic Valve Normal valve structure, no stenosis and no regurgitation. Mitral Valve Normal valve structure, no stenosis and no regurgitation. Tricuspid Valve Tricuspid valve not well visualized. No regurgitation.      CATH (12/19)  Left Main   The vessel is angiographically normal.   Left Anterior Descending   The vessel exhibits minimal luminal irregularities. Left Circumflex   Proximal-mid 95-99% tubular stenosis before bifurcating to OM branches. Status post PCI and stenting of LCx using 2.75 x 12 mm Xience drug-eluting stent   Prox Cx to Mid Cx lesion 95% stenosed. . Lesion is the culprit lesion. There is severe plaque burden detected. Right Coronary Artery   Proximal diffuse up to 90% disease followed by mid vessel 100% occlusion with evidence of homocollateral supplying distal branch. There was also evidence of left to right collateral supplying distal RCA. IMPRESSION & PLAN:  Ms. Ayanna Coleman is 57-year-old female with multiple medical problem    CAD:  Patient had a inferior STEMI in November 2019, underwent stenting of LCx. Detail anatomy as mentioned above. Currently no angina. Currently she is on aspirin, Brilinta, atorvastatin and Lopressor. We are going to try to establish her relationship with assistant program with the company to get free supply of Brilinta. If not, Brilinta will be changed to Plavix. Aspirin will be continued lifelong. Continue with beta-blocker and statin    Hypertension:  Initial BP was elevated. Repeat blood pressure was 140/50 mmHg. Continue beta-blocker    Hyperlipidemia:  Last LDL 96. Currently on high intensity atorvastatin. Repeat lipid profile in 3 months    Advised to limit weight lift to 20 lbs over next 3 months. This plan was discussed with patient who is in agreement. Thank you for allowing me to participate in patient care. Please feel free to call me if you have any question or concern. Skye Espinoza MD  Please note: This document has been produced using voice recognition software. Unrecognized errors in transcription may be present.

## 2019-12-18 ENCOUNTER — OFFICE VISIT (OUTPATIENT)
Dept: FAMILY MEDICINE CLINIC | Age: 54
End: 2019-12-18

## 2019-12-18 VITALS
HEART RATE: 62 BPM | HEIGHT: 64 IN | SYSTOLIC BLOOD PRESSURE: 115 MMHG | TEMPERATURE: 97.9 F | DIASTOLIC BLOOD PRESSURE: 79 MMHG | BODY MASS INDEX: 37.9 KG/M2 | WEIGHT: 222 LBS | OXYGEN SATURATION: 99 % | RESPIRATION RATE: 12 BRPM

## 2019-12-18 DIAGNOSIS — I21.11 ST ELEVATION MYOCARDIAL INFARCTION INVOLVING RIGHT CORONARY ARTERY (HCC): ICD-10-CM

## 2019-12-18 DIAGNOSIS — G47.33 OSA (OBSTRUCTIVE SLEEP APNEA): ICD-10-CM

## 2019-12-18 DIAGNOSIS — Z87.891 FORMER SMOKER: ICD-10-CM

## 2019-12-18 DIAGNOSIS — R06.02 SOB (SHORTNESS OF BREATH): ICD-10-CM

## 2019-12-18 DIAGNOSIS — I25.10 CAD S/P PERCUTANEOUS CORONARY ANGIOPLASTY: Primary | ICD-10-CM

## 2019-12-18 DIAGNOSIS — R06.2 WHEEZING: ICD-10-CM

## 2019-12-18 DIAGNOSIS — Z98.61 CAD S/P PERCUTANEOUS CORONARY ANGIOPLASTY: Primary | ICD-10-CM

## 2019-12-18 RX ORDER — ALBUTEROL SULFATE 90 UG/1
1-2 AEROSOL, METERED RESPIRATORY (INHALATION)
Qty: 1 INHALER | Refills: 0 | Status: SHIPPED | COMMUNITY
Start: 2019-12-18 | End: 2020-02-13

## 2019-12-18 NOTE — PROGRESS NOTES
12/18/19    PCP: Whitney Herrera NP    Chief Complaint   Patient presents with    Follow Up Chronic Condition        HISTORY OF PRESENT ILLNESS  Clayton Gastelum  is a 62 y.o. female with pmhx listed below whom presents for Follow Up Chronic Condition    She recently had an STEMI and is status post PCI. She is currently on Brilenta for anticoagulation. Cardiology is assisting with getting medication via patient assistance program. Patient is aware that if unable to get she will have to switch to Plavix. Her BP and heart rate is controlled on metoprolol. She denies any CP. She does report SOB and wheezing mostly with weather changes and exertion. She does also mention difficulty lying flat at night and feelings of catching her breath. She denies a h/o GUY however STOP-Bang Score 6 today in office. She does have a history of smoking, no dx of COPD or Asthma. Patient Active Problem List    Diagnosis Date Noted    STEMI (ST elevation myocardial infarction) ([facility] [address] 11/26/2019    Hematoma 11/26/2019    Severe obesity ([facility] [address] 09/23/2019    Hyperlipidemia 06/13/2017    Spondylolisthesis of thoracolumbar region 06/07/2017    Acquired hypothyroidism 06/07/2017    Pre-diabetes 06/07/2017    Chronic bilateral back pain 06/07/2017    Secondary hypertension 06/07/2017    Tobacco abuse 06/07/2017    Colon cancer screening 09/12/2016     Current Outpatient Medications   Medication Sig Dispense Refill    albuterol (PROVENTIL HFA, VENTOLIN HFA, PROAIR HFA) 90 mcg/actuation inhaler Take 1-2 Puffs by inhalation every four (4) hours as needed for Wheezing. 1 Inhaler 0    ticagrelor (BRILINTA) 90 mg tablet Take  by mouth two (2) times a day.  levothyroxine (SYNTHROID) 150 mcg tablet Take 1 Tab by mouth Daily (before breakfast). 30 Tab 4    metoprolol tartrate (LOPRESSOR) 25 mg tablet Take 1 Tab by mouth every twelve (12) hours.  Indications: EVAN 60 Tab 3    atorvastatin (LIPITOR) 80 mg tablet Take 1 Tab by mouth nightly. Indications: EVAN 90 Tab 1    Omeprazole delayed release (PRILOSEC D/R) 20 mg tablet Take 1 Tab by mouth daily. 30 Tab 0    aspirin 81 mg chewable tablet Take 1 Tab by mouth daily. 100 Tab 1    clopidogrel (PLAVIX) 75 mg tab Take 1 Tab by mouth daily. Indications: blood clot prevention following percutaneous coronary intervention, EVAN 90 Tab 3     Allergies   Allergen Reactions    Tramadol Vertigo     Pt reported blacking out     Past Medical History:   Diagnosis Date    CAD (coronary artery disease)     Diabetes ([facility] [address]     Diabetes Type II reported by patient    GERD (gastroesophageal reflux disease)     Hx of abnormal cervical Pap smear     s/p hysterectomy     Hypercholesterolemia     Hypertension     [facility]    Thyroid disease     hypothyroid     Past Surgical History:   Procedure Laterality Date    HX CARPAL TUNNEL RELEASE      right hand    HX GYN      partial hysterectomy    HX PARTIAL HYSTERECTOMY       Family History   Problem Relation Age of Onset    Thyroid Disease Mother     Depression Mother     Hypertension Father     Heart Disease Father     Thyroid Disease Sister     Hypertension Sister     Diabetes Maternal Aunt     Diabetes Sister     Eczema Sister     No Known Problems Brother     Alzheimer Paternal Grandmother     Diabetes Paternal Grandmother      Social History     Tobacco Use    Smoking status: Former Smoker     Packs/day: 1.00     Last attempt to quit: 2019     Years since quittin.6    Smokeless tobacco: Never Used   Substance Use Topics    Alcohol use: Yes     Frequency: Monthly or less     Drinks per session: 3 or 4     Binge frequency: Never     Comment: occasional on holidays       Review of Systems   Constitutional: Negative. HENT: Negative. Eyes: Negative. Respiratory: Positive for shortness of breath and wheezing. Cardiovascular: Positive for orthopnea.  Negative for chest pain, palpitations, claudication and leg swelling. Genitourinary: Negative. Neurological: Negative. Visit Vitals  /79   Pulse 62   Temp 97.9 °F (36.6 °C)   Resp 12   Ht 5' 4\" (1.626 m)   Wt 222 lb (100.7 kg)   SpO2 99%   BMI 38.11 kg/m²       Pain Scale: 0 - No pain/10    Pain Location:      Physical Exam  Vitals signs reviewed. HENT:      Head: Normocephalic. Eyes:      Pupils: Pupils are equal, round, and reactive to light. Neck:      Musculoskeletal: Normal range of motion and neck supple. Cardiovascular:      Rate and Rhythm: Normal rate and regular rhythm. Heart sounds: Normal heart sounds. Pulmonary:      Effort: Pulmonary effort is normal.      Breath sounds: Examination of the left-lower field reveals wheezing. Wheezing present. Abdominal:      General: Bowel sounds are normal.      Palpations: Abdomen is soft. Skin:     General: Skin is warm and dry. Neurological:      Mental Status: She is alert and oriented to person, place, and time. Psychiatric:         Mood and Affect: Mood and affect normal.             ASSESSMENT and PLAN    ICD-10-CM ICD-9-CM    1. CAD S/P percutaneous coronary angioplasty I25.10 414.01     Z98.61 V45.82    2. ST elevation myocardial infarction involving right coronary artery (HCC) I21.11 410.31    3. Wheezing R06.2 786.07 REFERRAL TO PULMONARY DISEASE      albuterol (PROVENTIL HFA, VENTOLIN HFA, PROAIR HFA) 90 mcg/actuation inhaler   4. GUY (obstructive sleep apnea) G47.33 327.23 REFERRAL TO PULMONARY DISEASE   5. SOB (shortness of breath) R06.02 786.05 REFERRAL TO PULMONARY DISEASE      albuterol (PROVENTIL HFA, VENTOLIN HFA, PROAIR HFA) 90 mcg/actuation inhaler   6. Former smoker Z87.891 V15.82 albuterol (PROVENTIL HFA, VENTOLIN HFA, PROAIR HFA) 90 mcg/actuation inhaler     Diagnoses and all orders for this visit:    1. CAD S/P percutaneous coronary angioplasty    2.  ST elevation myocardial infarction involving right coronary artery ([facility]    3. Wheezing  -     REFERRAL TO PULMONARY DISEASE  -     albuterol (PROVENTIL HFA, VENTOLIN HFA, PROAIR HFA) 90 mcg/actuation inhaler; Take 1-2 Puffs by inhalation every four (4) hours as needed for Wheezing. 4. GUY (obstructive sleep apnea)  -     REFERRAL TO PULMONARY DISEASE    5. SOB (shortness of breath)  -     REFERRAL TO PULMONARY DISEASE  -     albuterol (PROVENTIL HFA, VENTOLIN HFA, PROAIR HFA) 90 mcg/actuation inhaler; Take 1-2 Puffs by inhalation every four (4) hours as needed for Wheezing. 6. Former smoker  -     albuterol (PROVENTIL HFA, VENTOLIN HFA, PROAIR HFA) 90 mcg/actuation inhaler; Take 1-2 Puffs by inhalation every four (4) hours as needed for Wheezing. current treatment plan is effective, no change in therapy   Continue Brillenta until otherwise notified by cardiology. There are no discontinued medications. Written instructions followed our verbal discussion of all information discussed above, pending tests ordered and future goals/plans. Patient expressed understanding of current diagnosis, planned testing, follow up and if needed to contact the office for any questions or concerns prior to the next visit.

## 2020-02-13 ENCOUNTER — OFFICE VISIT (OUTPATIENT)
Dept: FAMILY MEDICINE CLINIC | Age: 55
End: 2020-02-13

## 2020-02-13 VITALS
BODY MASS INDEX: 36.54 KG/M2 | WEIGHT: 214 LBS | HEART RATE: 65 BPM | SYSTOLIC BLOOD PRESSURE: 103 MMHG | DIASTOLIC BLOOD PRESSURE: 71 MMHG | RESPIRATION RATE: 16 BRPM | TEMPERATURE: 98 F | HEIGHT: 64 IN | OXYGEN SATURATION: 98 %

## 2020-02-13 DIAGNOSIS — Z79.899 MEDICATION MANAGEMENT: ICD-10-CM

## 2020-02-13 DIAGNOSIS — I21.11 ST ELEVATION MYOCARDIAL INFARCTION INVOLVING RIGHT CORONARY ARTERY (HCC): ICD-10-CM

## 2020-02-13 DIAGNOSIS — Z79.01 CURRENT USE OF LONG TERM ANTICOAGULATION: ICD-10-CM

## 2020-02-13 DIAGNOSIS — Z28.21 REFUSED INFLUENZA VACCINE: ICD-10-CM

## 2020-02-13 DIAGNOSIS — E78.2 MIXED HYPERLIPIDEMIA: ICD-10-CM

## 2020-02-13 DIAGNOSIS — J30.89 ENVIRONMENTAL AND SEASONAL ALLERGIES: Primary | ICD-10-CM

## 2020-02-13 DIAGNOSIS — E03.9 ACQUIRED HYPOTHYROIDISM: ICD-10-CM

## 2020-02-13 RX ORDER — FLUTICASONE PROPIONATE 50 MCG
2 SPRAY, SUSPENSION (ML) NASAL
Qty: 1 BOTTLE | Refills: 3 | Status: SHIPPED | COMMUNITY
Start: 2020-02-13

## 2020-02-13 NOTE — PROGRESS NOTES
[address]  [address] [facility], [address] [place] [address]  [phone] office/[phone] fax      2020    Reason for visit:   Chief Complaint   Patient presents with    Follow Up Chronic Condition       Patient: Thea King, 1965, [id number]       Primary MD: No primary care provider on file.     Subjective:   Thea King, a 62 y.o. female, who presents for Follow Up Chronic Condition      Past Medical History:   Diagnosis Date    CAD (coronary artery disease)     Diabetes ([facility]     Diabetes Type II reported by patient    GERD (gastroesophageal reflux disease)     H/O screening mammography 2019    no evidence of malignancy    Hx of abnormal cervical Pap smear     s/p hysterectomy     Hypercholesterolemia     Hypertension     STEMI (ST elevation myocardial infarction) ([facility] 2019    Thyroid disease     hypothyroid       Past Surgical History:   Procedure Laterality Date    HX CARPAL TUNNEL RELEASE      right hand    HX GYN      partial hysterectomy    HX PARTIAL HYSTERECTOMY         Social History     Socioeconomic History    Marital status: SINGLE     Spouse name: Not on file    Number of children: 2    Years of education: 6    Highest education level: GED or equivalent   Occupational History    Occupation:      Employer: [employer] resource strain: Not on file    Food insecurity:     Worry: Not on file     Inability: Not on file   Coty needs:     Medical: Not on file     Non-medical: Not on file   Tobacco Use    Smoking status: Former Smoker     Packs/day: 1.00     Last attempt to quit: 2019     Years since quittin.7    Smokeless tobacco: Never Used   Substance and Sexual Activity    Alcohol use: Yes     Frequency: Monthly or less     Drinks per session: 3 or 4     Binge frequency: Never     Comment: occasional on holidays    Drug use: No    Sexual activity: Yes     Partners: Male Birth control/protection: Surgical   Lifestyle    Physical activity:     Days per week: 0 days     Minutes per session: 0 min    Stress: Not at all   Relationships    Social connections:     Talks on phone: Not on file     Gets together: Not on file     Attends Hoahaoism service: Not on file     Active member of club or organization: Not on file     Attends meetings of clubs or organizations: Not on file     Relationship status: Not on file    Intimate partner violence:     Fear of current or ex partner: Not on file     Emotionally abused: Not on file     Physically abused: Not on file     Forced sexual activity: Not on file   Other Topics Concern     Service No    Blood Transfusions No    Caffeine Concern No    Occupational Exposure No    Hobby Hazards No    Sleep Concern No    Stress Concern No    Weight Concern No    Special Diet No    Back Care Yes     Comment: \"Arthritis\"    Exercise No    Bike Helmet No    Seat Belt Yes    Self-Exams No   Social History Narrative    Not on file       Allergies   Allergen Reactions    Tramadol Vertigo     Pt reported blacking out       Current Outpatient Medications on File Prior to Visit   Medication Sig Dispense Refill    levothyroxine (SYNTHROID) 150 mcg tablet Take 1 Tab by mouth Daily (before breakfast). 30 Tab 4    metoprolol tartrate (LOPRESSOR) 25 mg tablet Take 1 Tab by mouth every twelve (12) hours. Indications: EVAN 60 Tab 3    atorvastatin (LIPITOR) 80 mg tablet Take 1 Tab by mouth nightly. Indications: EVAN 90 Tab 1    clopidogrel (PLAVIX) 75 mg tab Take 1 Tab by mouth daily. Indications: blood clot prevention following percutaneous coronary intervention, EVAN 90 Tab 3    Omeprazole delayed release (PRILOSEC D/R) 20 mg tablet Take 1 Tab by mouth daily. 30 Tab 0    aspirin 81 mg chewable tablet Take 1 Tab by mouth daily.  100 Tab 1    [DISCONTINUED] albuterol (PROVENTIL HFA, VENTOLIN HFA, PROAIR HFA) 90 mcg/actuation inhaler Take 1-2 Puffs by inhalation every four (4) hours as needed for Wheezing. 1 Inhaler 0    [DISCONTINUED] ticagrelor (BRILINTA) 90 mg tablet Take  by mouth two (2) times a day. No current facility-administered medications on file prior to visit. Review of Systems   Constitutional: Negative. HENT: Positive for congestion, ear pain (Ear is sensitive on the left. ) and sinus pain. Negative for sore throat. Just got over the flu. I was down for 5 days. Eyes: Negative. Respiratory: Negative for cough, shortness of breath and wheezing. Quit smoking 9 months ago. Sometimes I get congested but I cough and it goes away. Cardiovascular: Negative for chest pain, palpitations and leg swelling. Wore the wrong shoes the other day and caused some swelling in my right ankle. Dr Saeid Levin, cardio, wants me on brilinta but told me to continue plavix until brilinta is available via [facility]. Gastrointestinal: Negative. Genitourinary: Negative. Musculoskeletal: Negative. Skin: Negative. Neurological: Negative for dizziness, tingling, weakness and headaches. Endo/Heme/Allergies: Positive for environmental allergies. Psychiatric/Behavioral: Negative. Objective:   Visit Vitals  /71   Pulse 65   Temp 98 °F (36.7 °C)   Resp 16   Ht 5' 4\" (1.626 m)   Wt 214 lb (97.1 kg)   SpO2 98%   BMI 36.73 kg/m²      Wt Readings from Last 3 Encounters:   02/13/20 214 lb (97.1 kg)   12/18/19 222 lb (100.7 kg)   12/10/19 220 lb (99.8 kg)     Lab Results   Component Value Date/Time    Glucose 104 (H) 11/27/2019 05:24 AM    Glucose (POC) 128 (H) 11/27/2019 03:58 PM    Glucose,  (H) 11/26/2019 03:55 AM         Physical Exam  Constitutional:       Appearance: Normal appearance. HENT:      Head: Normocephalic and atraumatic.       Right Ear: Tympanic membrane, ear canal and external ear normal.      Left Ear: Tympanic membrane and external ear normal.      Ears:      Comments: Left canal erythema     Nose: Rhinorrhea present. No congestion. Mouth/Throat:      Mouth: Mucous membranes are moist.      Pharynx: Posterior oropharyngeal erythema (mild) present. Neck:      Musculoskeletal: Normal range of motion and neck supple. Cardiovascular:      Rate and Rhythm: Normal rate and regular rhythm. Pulmonary:      Effort: Pulmonary effort is normal. No respiratory distress. Breath sounds: Normal breath sounds. No wheezing. Musculoskeletal: Normal range of motion. Right lower leg: No edema. Left lower leg: No edema. Skin:     General: Skin is warm and dry. Neurological:      Mental Status: She is alert and oriented to person, place, and time. Psychiatric:         Mood and Affect: Mood normal.         Behavior: Behavior normal.       Assessment:    Joaquin Ahumada who has risk factors including (see above previous medical hx) and:     1. Environmental and seasonal allergies  Sinus action plan! Instructed pt to: Avoid the allergen if you know what it is (strong smells, animals, cigarettes or carpeted areas)    Start medications as soon as the first sneeze, runny nose, watery eye or tickle in your throat appears and plan to continue it EVERY DAY for the next 2-4 weeks. Antihistamine:   Nasal spray (generic), as directed to help turn off the allergic reaction in your sinus. Instructed patient to sit up slowly when getting up from a lying position and then wait a moment before standing. Once standing, pt needs to wait a moment before attempting to walk. This will help prevent the loss of equilibrium when the allergies are active. Patient verbalized understanding.      - fluticasone propionate (FLONASE) 50 mcg/actuation nasal spray; 2 Sprays by Both Nostrils route daily as needed for Allergies. Dispense: 1 Bottle; Refill: 3    2. Acquired hypothyroidism  Continue Synthroid therapy    3. Mixed hyperlipidemia  Continue Atorvastatin    4.  ST elevation myocardial infarction involving right coronary artery [facility])  Dr Saeid Levin follow up appt 3/31/2020    5. Current use of long term anticoagulation      6. Refused influenza vaccine  Pt educated on the importance of obtaining the influenza vaccines. Pt verb understanding. 7. Medication management  Due to the [facility] program closing at the end of 2020, pt can no longer obtain medications via [facility]. Pt verb understanding. Pt to contact [facility] today before leaving office to obtain any remaining medications that may be available to patient. Pt does not need refills for metoprolol, atorvastatin, or synthroid. Pt is taking plavix not brilinta because brilinta not available in [facility]. Cardio is trying to get pts brilinta covered under the indigent program.       Written instructions followed our verbal discussion of all information discussed above, pending tests ordered and future goals/plans. Patient expressed understanding of current diagnosis, planned testing, follow up and if needed to contact the office for any questions or concerns prior to the next visit. Plan:   Med reconciliation completed with patient. Reviewed side effects of medications with the patient. Questions were answered and patient verb understanding. Discussed lab results with patient    N/a    Orders Placed This Encounter    fluticasone propionate (FLONASE) 50 mcg/actuation nasal spray     Si Sprays by Both Nostrils route daily as needed for Allergies. Dispense:  1 Bottle     Refill:  3     Order Specific Question:   Expiration Date     Answer:   10/31/2020     Order Specific Question:   Lot#     Answer:   [id number]     Order Specific Question:        Answer:   MICAH     Current Outpatient Medications   Medication Sig Dispense Refill    fluticasone propionate (FLONASE) 50 mcg/actuation nasal spray 2 Sprays by Both Nostrils route daily as needed for Allergies.  1 Bottle 3    levothyroxine (SYNTHROID) 150 mcg tablet Take 1 Tab by mouth Daily (before breakfast). 30 Tab 4    metoprolol tartrate (LOPRESSOR) 25 mg tablet Take 1 Tab by mouth every twelve (12) hours. Indications: EVAN 60 Tab 3    atorvastatin (LIPITOR) 80 mg tablet Take 1 Tab by mouth nightly. Indications: EVAN 90 Tab 1    clopidogrel (PLAVIX) 75 mg tab Take 1 Tab by mouth daily. Indications: blood clot prevention following percutaneous coronary intervention, EVAN 90 Tab 3    Omeprazole delayed release (PRILOSEC D/R) 20 mg tablet Take 1 Tab by mouth daily. 30 Tab 0    aspirin 81 mg chewable tablet Take 1 Tab by mouth daily. 100 Tab 1         Pt notified of provider leaving the clinic as of 3/14/2020. Strongly encouraged pt to start seeking a new PCP. In pts dc paperwork, Pt was given some information to help seek out a new provider. Patient verbalized understanding. Encouraged pt to see [facility] for [place] application. Valeri Cheung. [employer], [address] [facility]      I spent 25 minutes with the patient in face-to-face consultation, of which greater than 50% was spent in counseling and coordination of care as described above.

## 2020-02-13 NOTE — PATIENT INSTRUCTIONS
!!PLEASE READ!! [facility] will be closing its doors approximately March 14, 2020. You can seek urgent medical care at any emergency department. For primary care purposes, please apply for the medicaid expansion at the financial office at [facility]. If you don't qualify for medicaid, you can seek primary care at any clinic within [facility]. See below:  1) [place] [place] [facility]- go onto website to see schedule. This is a mobile unit and goes from city to city. 2) [address] [facility], [place], [address] 27 [id number] [facility] [place] [address], [address]  [phone]  4) [facility] [place], [place] [phone]  5) [address] [phone]    TID BITS:  Disposing of medications in the household trash: Almost all medicines can be thrown into your household trash. These include prescription and over-the-counter (OTC) drugs in pills, liquids, drops, patches, creams, and inhalers. Follow these steps:  1) Remove the drugs from their original containers and mix them with something undesirable, such as used coffee grounds, dirt, or cat litter. This makes the medicine less appealing to children and pets and unrecognizable to someone who might intentionally go through the trash looking for drugs. 2) Put the mixture in something you can close (a re-sealable zipper storage bag, empty can, or other container) to prevent the drug from leaking or spilling out. 3) Throw the container in the garbage. 4) Scratch out all your personal information on the empty medicine packaging to protect your identity and privacy. Throw the packaging away.   If you have a question about your medicine, ask your health care provider or pharmacist.                   Allergies: Care Instructions  Your Care Instructions    Allergies occur when your body's defense system (immune system) overreacts to certain substances. The immune system treats a harmless substance as if it were a harmful germ or virus. Many things can cause this overreaction, including pollens, medicine, food, dust, animal dander, and mold. Allergies can be mild or severe. Mild allergies can be managed with home treatment. But medicine may be needed to prevent problems. Managing your allergies is an important part of staying healthy. Your doctor may suggest that you have allergy testing to help find out what is causing your allergies. When you know what things trigger your symptoms, you can avoid them. This can prevent allergy symptoms and other health problems. For severe allergies that cause reactions that affect your whole body (anaphylactic reactions), your doctor may prescribe a shot of epinephrine to carry with you in case you have a severe reaction. Learn how to give yourself the shot and keep it with you at all times. Make sure it is not . Follow-up care is a key part of your treatment and safety. Be sure to make and go to all appointments, and call your doctor if you are having problems. It's also a good idea to know your test results and keep a list of the medicines you take. How can you care for yourself at home? · If you have been told by your doctor that dust or dust mites are causing your allergy, decrease the dust around your bed:  ? Wash sheets, pillowcases, and other bedding in hot water every week. ? Use dust-proof covers for pillows, duvets, and mattresses. Avoid plastic covers because they tear easily and do not \"breathe. \" Wash as instructed on the label. ? Do not use any blankets and pillows that you do not need. ? Use blankets that you can wash in your washing machine. ? Consider removing drapes and carpets, which attract and hold dust, from your bedroom. · If you are allergic to house dust and mites, do not use home humidifiers.  Your doctor can suggest ways you can control dust and mites. · Look for signs of cockroaches. Cockroaches cause allergic reactions. Use cockroach baits to get rid of them. Then, clean your home well. Cockroaches like areas where grocery bags, newspapers, empty bottles, or cardboard boxes are stored. Do not keep these inside your home, and keep trash and food containers sealed. Seal off any spots where cockroaches might enter your home. · If you are allergic to mold, get rid of furniture, rugs, and drapes that smell musty. Check for mold in the bathroom. · If you are allergic to outdoor pollen or mold spores, use air-conditioning. Change or clean all filters every month. Keep windows closed. · If you are allergic to pollen, stay inside when pollen counts are high. Use a vacuum  with a HEPA filter or a double-thickness filter at least two times each week. · Stay inside when air pollution is bad. Avoid paint fumes, perfumes, and other strong odors. · Avoid conditions that make your allergies worse. Stay away from smoke. Do not smoke or let anyone else smoke in your house. Do not use fireplaces or wood-burning stoves. · If you are allergic to your pets, change the air filter in your furnace every month. Use high-efficiency filters. · If you are allergic to pet dander, keep pets outside or out of your bedroom. Old carpet and cloth furniture can hold a lot of animal dander. You may need to replace them. When should you call for help? Give an epinephrine shot if:    · You think you are having a severe allergic reaction.     · You have symptoms in more than one body area, such as mild nausea and an itchy mouth.    After giving an epinephrine shot call 911, even if you feel better.   Call 911 if:    · You have symptoms of a severe allergic reaction. These may include:  ? Sudden raised, red areas (hives) all over your body. ? Swelling of the throat, mouth, lips, or tongue. ? Trouble breathing.   ? Passing out (losing consciousness). Or you may feel very lightheaded or suddenly feel weak, confused, or restless.     · You have been given an epinephrine shot, even if you feel better.    Call your doctor now or seek immediate medical care if:    · You have symptoms of an allergic reaction, such as:  ? A rash or hives (raised, red areas on the skin). ? Itching. ? Swelling. ? Belly pain, nausea, or vomiting.    Watch closely for changes in your health, and be sure to contact your doctor if:    · You do not get better as expected. Where can you learn more? Go to http://ralph-paige.info/. Enter V301 in the search box to learn more about \"Allergies: Care Instructions. \"  Current as of: April 7, 2019  Content Version: 12.2  © 2669-8471 [employer]. Care instructions adapted under license by [employer] (which disclaims liability or warranty for this information). If you have questions about a medical condition or this instruction, always ask your healthcare professional. [address] any warranty or liability for your use of this information.

## 2020-02-13 NOTE — LETTER
2/13/2020 [facility] [address] [facility], [address] Raymond Merritt, 1965, is picking up the following medications ordered from the [facility]: STOCK:  NASONEX 50 MCG #1 Estephanie Pollack Patient's Signature: _____________________________ Today's Date: 2/13/2020

## 2020-02-20 ENCOUNTER — TELEPHONE (OUTPATIENT)
Dept: FAMILY MEDICINE CLINIC | Age: 55
End: 2020-02-20

## 2020-05-20 ENCOUNTER — VIRTUAL VISIT (OUTPATIENT)
Dept: FAMILY MEDICINE CLINIC | Age: 55
End: 2020-05-20

## 2020-05-20 DIAGNOSIS — I21.11 ST ELEVATION MYOCARDIAL INFARCTION INVOLVING RIGHT CORONARY ARTERY (HCC): ICD-10-CM

## 2020-05-20 DIAGNOSIS — I10 ESSENTIAL HYPERTENSION: ICD-10-CM

## 2020-05-20 DIAGNOSIS — E03.9 ACQUIRED HYPOTHYROIDISM: Primary | ICD-10-CM

## 2020-05-20 DIAGNOSIS — Z98.61 CAD S/P PERCUTANEOUS CORONARY ANGIOPLASTY: ICD-10-CM

## 2020-05-20 DIAGNOSIS — I21.3 ST ELEVATION MYOCARDIAL INFARCTION (STEMI), UNSPECIFIED ARTERY (HCC): ICD-10-CM

## 2020-05-20 DIAGNOSIS — I25.10 CAD S/P PERCUTANEOUS CORONARY ANGIOPLASTY: ICD-10-CM

## 2020-05-20 RX ORDER — LEVOTHYROXINE SODIUM 150 UG/1
150 TABLET ORAL
Qty: 30 TAB | Refills: 4 | Status: SHIPPED | OUTPATIENT
Start: 2020-05-20 | End: 2020-10-30 | Stop reason: SDUPTHER

## 2020-05-20 RX ORDER — ATORVASTATIN CALCIUM 80 MG/1
80 TABLET, FILM COATED ORAL
Qty: 90 TAB | Refills: 1 | Status: SHIPPED | OUTPATIENT
Start: 2020-05-20 | End: 2020-10-30 | Stop reason: SDUPTHER

## 2020-05-20 RX ORDER — METOPROLOL TARTRATE 25 MG/1
25 TABLET, FILM COATED ORAL EVERY 12 HOURS
Qty: 60 TAB | Refills: 3 | Status: SHIPPED | OUTPATIENT
Start: 2020-05-20 | End: 2020-10-30 | Stop reason: SDUPTHER

## 2020-05-20 NOTE — PROGRESS NOTES
JABARI Greer is a 62 y.o. female being seen today for No chief complaint on file. .this is a patietn previously seen by the care mary Gallardo a few years ago who is transferring care back now that [address] [place] [address] clinic is closed. she states that since she last saw us she did stop smoking and then unfortunatiely had an MI last November. She is hypothyroid. She is compliant with all of her meds which are reviewed. She is in need of refills. Past Medical History:   Diagnosis Date    CAD (coronary artery disease)     [facility] ([facility]     Diabetes Type II reported by patient    GERD (gastroesophageal reflux disease)     H/O screening mammography 12/03/2019    no evidence of malignancy    Hx of abnormal cervical Pap smear     s/p hysterectomy     Hypercholesterolemia     Hypertension     STEMI ([facility] elevation myocardial infarction) ([facility] 12/2019    Thyroid disease     hypothyroid         ROS  Patient states that she is feeling well. Denies complaints of chest pain, shortness of breath, swelling of legs, dizziness or weakness. she denies nausea, vomiting or diarrhea. Current Outpatient Medications   Medication Sig    fluticasone propionate (FLONASE) 50 mcg/actuation nasal spray 2 Sprays by Both Nostrils route daily as needed for Allergies.  levothyroxine (SYNTHROID) 150 mcg tablet Take 1 Tab by mouth Daily (before breakfast).  metoprolol tartrate (LOPRESSOR) 25 mg tablet Take 1 Tab by mouth every twelve (12) hours. Indications: EVAN    atorvastatin (LIPITOR) 80 mg tablet Take 1 Tab by mouth nightly. Indications: EVAN    clopidogrel (PLAVIX) 75 mg tab Take 1 Tab by mouth daily. Indications: blood clot prevention following percutaneous coronary intervention, EVAN    Omeprazole delayed release (PRILOSEC D/R) 20 mg tablet Take 1 Tab by mouth daily.  aspirin 81 mg chewable tablet Take 1 Tab by mouth daily. No current facility-administered medications for this visit.         PE  There were no vitals taken for this visit. Alert and oriented with normal mood and affect. Assessment and Plan:        ICD-10-CM ICD-9-CM    1. Acquired hypothyroidism E03.9 244.9    2. Essential hypertension I10 401.9    3. ST elevation myocardial infarction (STEMI), unspecified artery (HCC) I21.3 410.90      Refill meds as current  Follow up cardiology as scheduled  Come see us at the [place] when we resume community clinics.      Charmayne Kapur, MD

## 2020-10-30 ENCOUNTER — TELEPHONE (OUTPATIENT)
Dept: FAMILY MEDICINE CLINIC | Facility: CLINIC | Age: 55
End: 2020-10-30

## 2020-10-30 DIAGNOSIS — I25.10 CAD S/P PERCUTANEOUS CORONARY ANGIOPLASTY: ICD-10-CM

## 2020-10-30 DIAGNOSIS — Z98.61 CAD S/P PERCUTANEOUS CORONARY ANGIOPLASTY: ICD-10-CM

## 2020-10-30 DIAGNOSIS — E03.9 ACQUIRED HYPOTHYROIDISM: ICD-10-CM

## 2020-10-30 DIAGNOSIS — I21.11 ST ELEVATION MYOCARDIAL INFARCTION INVOLVING RIGHT CORONARY ARTERY (HCC): ICD-10-CM

## 2020-10-30 RX ORDER — LEVOTHYROXINE SODIUM 100 UG/1
100 TABLET ORAL
COMMUNITY
End: 2020-10-30

## 2020-10-30 RX ORDER — LEVOTHYROXINE SODIUM 150 UG/1
150 TABLET ORAL
Qty: 30 TAB | Refills: 2 | Status: SHIPPED | OUTPATIENT
Start: 2020-10-30 | End: 2021-02-11 | Stop reason: SDUPTHER

## 2020-10-30 RX ORDER — ATORVASTATIN CALCIUM 80 MG/1
80 TABLET, FILM COATED ORAL
Qty: 90 TAB | Refills: 0 | Status: SHIPPED | OUTPATIENT
Start: 2020-10-30 | End: 2021-06-16 | Stop reason: SDUPTHER

## 2020-10-30 RX ORDER — CLOPIDOGREL BISULFATE 75 MG/1
75 TABLET ORAL DAILY
Qty: 90 TAB | Refills: 0 | Status: SHIPPED | OUTPATIENT
Start: 2020-10-30 | End: 2021-02-11 | Stop reason: SDUPTHER

## 2020-10-30 RX ORDER — METOPROLOL TARTRATE 25 MG/1
25 TABLET, FILM COATED ORAL EVERY 12 HOURS
Qty: 60 TAB | Refills: 2 | Status: SHIPPED | OUTPATIENT
Start: 2020-10-30 | End: 2021-02-11 | Stop reason: SDUPTHER

## 2020-12-14 ENCOUNTER — OFFICE VISIT (OUTPATIENT)
Dept: FAMILY MEDICINE CLINIC | Facility: CLINIC | Age: 55
End: 2020-12-14

## 2020-12-14 ENCOUNTER — HOSPITAL ENCOUNTER (OUTPATIENT)
Dept: LAB | Age: 55
Discharge: HOME | End: 2020-12-14

## 2020-12-14 VITALS
HEART RATE: 81 BPM | WEIGHT: 221 LBS | BODY MASS INDEX: 37.73 KG/M2 | OXYGEN SATURATION: 96 % | TEMPERATURE: 97.5 F | SYSTOLIC BLOOD PRESSURE: 146 MMHG | DIASTOLIC BLOOD PRESSURE: 77 MMHG | RESPIRATION RATE: 16 BRPM | HEIGHT: 64 IN

## 2020-12-14 DIAGNOSIS — I21.11 ST ELEVATION MYOCARDIAL INFARCTION INVOLVING RIGHT CORONARY ARTERY (HCC): ICD-10-CM

## 2020-12-14 DIAGNOSIS — E03.9 ACQUIRED HYPOTHYROIDISM: Primary | ICD-10-CM

## 2020-12-14 DIAGNOSIS — I10 ESSENTIAL HYPERTENSION: ICD-10-CM

## 2020-12-14 DIAGNOSIS — M79.602 PAIN IN INFERIOR LEFT UPPER EXTREMITY: ICD-10-CM

## 2020-12-14 DIAGNOSIS — E03.9 ACQUIRED HYPOTHYROIDISM: ICD-10-CM

## 2020-12-14 DIAGNOSIS — R73.03 PRE-DIABETES: ICD-10-CM

## 2020-12-14 LAB
ALBUMIN SERPL-MCNC: 3.9 G/DL (ref 3.4–5)
ALBUMIN/GLOB SERPL: 1.2 {RATIO} (ref 0.8–1.7)
ALP SERPL-CCNC: 77 U/L (ref 45–117)
ALT SERPL-CCNC: 21 U/L (ref 13–56)
ANION GAP SERPL CALC-SCNC: 6 MMOL/L (ref 3–18)
AST SERPL-CCNC: 24 U/L (ref 10–38)
BILIRUB SERPL-MCNC: 0.7 MG/DL (ref 0.2–1)
BUN SERPL-MCNC: 14 MG/DL (ref 7–18)
BUN/CREAT SERPL: 15 (ref 12–20)
CALCIUM SERPL-MCNC: 9.4 MG/DL (ref 8.5–10.1)
CHLORIDE SERPL-SCNC: 113 MMOL/L (ref 100–111)
CHOLEST SERPL-MCNC: 191 MG/DL
CO2 SERPL-SCNC: 28 MMOL/L (ref 21–32)
CREAT SERPL-MCNC: 0.92 MG/DL (ref 0.6–1.3)
EST. AVERAGE GLUCOSE BLD GHB EST-MCNC: 126 MG/DL
GLOBULIN SER CALC-MCNC: 3.2 G/DL (ref 2–4)
GLUCOSE SERPL-MCNC: 107 MG/DL (ref 74–99)
HBA1C MFR BLD: 6 % (ref 4.2–5.6)
HDLC SERPL-MCNC: 54 MG/DL (ref 40–60)
HDLC SERPL: 3.5 {RATIO} (ref 0–5)
LDLC SERPL CALC-MCNC: 114.2 MG/DL (ref 0–100)
LIPID PROFILE,FLP: ABNORMAL
POTASSIUM SERPL-SCNC: 4.5 MMOL/L (ref 3.5–5.5)
PROT SERPL-MCNC: 7.1 G/DL (ref 6.4–8.2)
SODIUM SERPL-SCNC: 147 MMOL/L (ref 136–145)
T4 FREE SERPL-MCNC: 1.6 NG/DL (ref 0.7–1.5)
TRIGL SERPL-MCNC: 114 MG/DL (ref ?–150)
TSH SERPL DL<=0.05 MIU/L-ACNC: 0.44 UIU/ML (ref 0.36–3.74)
VLDLC SERPL CALC-MCNC: 22.8 MG/DL

## 2020-12-14 PROCEDURE — 84443 ASSAY THYROID STIM HORMONE: CPT

## 2020-12-14 PROCEDURE — 80061 LIPID PANEL: CPT

## 2020-12-14 PROCEDURE — 80053 COMPREHEN METABOLIC PANEL: CPT

## 2020-12-14 PROCEDURE — 99213 OFFICE O/P EST LOW 20 MIN: CPT | Performed by: FAMILY MEDICINE

## 2020-12-14 PROCEDURE — 83036 HEMOGLOBIN GLYCOSYLATED A1C: CPT

## 2020-12-14 PROCEDURE — 84439 ASSAY OF FREE THYROXINE: CPT

## 2020-12-14 NOTE — PROGRESS NOTES
JABARI Sullivan is a 60 y.o. female being seen today for   Chief Complaint   Patient presents with   [facility]   . follow up for this pt with hypothyroid, CAD, s/p MI.    she states that she is feeling well, here for labs and recheck. She has all her meds and taking them. Still not smoking since she quit over a year ago. She has not followed up with cardiology. Due for this. Also due for colon cancer screening. No chest pain. She does have left arm pain with lifiting and with certain motions. Sometimes bothers her at night when she lies in a certain position. Occasional pins and needles in the arm and hand. She had carpal tunnel on the right and it felt about like this in the past.  Does have neck pain in certain positions too but has not really noticed a connection with the neck and arm pain. Virtual visit due to COVID precautions    Past Medical History:   Diagnosis Date    CAD (coronary artery disease)     Diabetes ([facility] [address]     Diabetes Type II reported by patient    GERD (gastroesophageal reflux disease)     H/O screening mammography 12/03/2019    no evidence of malignancy    Hx of abnormal cervical Pap smear     s/p hysterectomy     Hypercholesterolemia     Hypertension     STEMI (ST elevation myocardial infarction) ([facility] [address] 12/2019    Thyroid disease     hypothyroid         ROS  Patient states that she is feeling well. Denies complaints of chest pain, shortness of breath, swelling of legs, dizziness or weakness. she denies nausea, vomiting or diarrhea. Current Outpatient Medications   Medication Sig    clopidogreL (Plavix) 75 mg tab Take 1 Tab by mouth daily. Indications: blood clot prevention following percutaneous coronary intervention, EVAN    atorvastatin (LIPITOR) 80 mg tablet Take 1 Tab by mouth nightly. Indications: EVAN    metoprolol tartrate (LOPRESSOR) 25 mg tablet Take 1 Tab by mouth every twelve (12) hours.  Indications: EVAN    levothyroxine (SYNTHROID) 150 mcg tablet Take 1 Tab by mouth Daily (before breakfast).  Omeprazole delayed release (PRILOSEC D/R) 20 mg tablet Take 1 Tab by mouth daily.  aspirin 81 mg chewable tablet Take 1 Tab by mouth daily.  fluticasone propionate (FLONASE) 50 mcg/actuation nasal spray 2 Sprays by Both Nostrils route daily as needed for Allergies. No current facility-administered medications for this visit. PE  Visit Vitals  BP (!) 146/77 (BP 1 Location: Left arm, BP Patient Position: Sitting)   Pulse 81   Temp 97.5 °F (36.4 °C) (Temporal)   Resp 16   Ht 5' 4\" (1.626 m)   Wt 221 lb (100.2 kg)   SpO2 96%   BMI 37.93 kg/m²        Alert and oriented with normal mood and affect. Assessment and Plan:        ICD-10-CM ICD-9-CM    1. Acquired hypothyroidism  E03.9 244.9 T4, FREE      TSH 3RD GENERATION      HEMOGLOBIN A1C WITH EAG      LIPID PANEL      METABOLIC PANEL, COMPREHENSIVE      OCCULT BLOOD IMMUNOASSAY,DIAGNOSTIC   2. ST elevation myocardial infarction involving right coronary artery (HCC)  I21.11 410.31 T4, FREE      TSH 3RD GENERATION      HEMOGLOBIN A1C WITH EAG      LIPID PANEL      METABOLIC PANEL, COMPREHENSIVE      OCCULT BLOOD IMMUNOASSAY,DIAGNOSTIC   3. Pain in inferior left upper extremity  M79.602 729.5    4. Pre-diabetes  R73.03 790.29    5. Essential hypertension  I10 401.9      Labs today for thyroid and prediabetes. Unclear cause of arm pain. Try stretches for carpal tunnel and possible cervical radiculopathy  Follow up cardiology. bp is on the high side today. Monitor for now and recheck at cardiology. She will give them a call to schedule.         Sharlene Gastelum MD

## 2020-12-14 NOTE — PROGRESS NOTES
Patient presents for lab draw ordered by:    Ordering Provider:  Dr. Jolene Allred Department/Practice:  [facility]  Phone:  [phone]  Date Ordered:  12/14/2020    The following labs were drawn and sent to 12/14/ 2020 [facility]:    BMP, Lipid Profile, TSH, 3rd Generation and HgA1C    The following tubes were sent:    Gold  ( 2) and Lavender  ( 1)    Draw site left brachial.  Patient tolerated draw with no distress.

## 2020-12-14 NOTE — PATIENT INSTRUCTIONS
Remember to call cardiology to make a follow up appointment. Neck: Exercises Introduction Here are some examples of exercises for you to try. The exercises may be suggested for a condition or for rehabilitation. Start each exercise slowly. Ease off the exercises if you start to have pain. You will be told when to start these exercises and which ones will work best for you. How to do the exercises Neck stretch 1. This stretch works best if you keep your shoulder down as you lean away from it. To help you remember to do this, start by relaxing your shoulders and lightly holding on to your thighs or your chair. 2. Tilt your head toward your shoulder and hold for 15 to 30 seconds. Let the weight of your head stretch your muscles. 3. If you would like a little added stretch, use your hand to gently and steadily pull your head toward your shoulder. For example, keeping your right shoulder down, lean your head to the left. 4. Repeat 2 to 4 times toward each shoulder. Diagonal neck stretch 1. Turn your head slightly toward the direction you will be stretching, and tilt your head diagonally toward your chest and hold for 15 to 30 seconds. 2. If you would like a little added stretch, use your hand to gently and steadily pull your head forward on the diagonal. 
3. Repeat 2 to 4 times toward each side. Dorsal glide stretch The dorsal glide stretches the back of the neck. If you feel pain, do not glide so far back. Some people find this exercise easier to do while lying on their backs with an ice pack on the neck. 1. Sit or stand tall and look straight ahead. 2. Slowly tuck your chin as you glide your head backward over your body 3. Hold for a count of 6, and then relax for up to 10 seconds. 4. Repeat 8 to 12 times. Chest and shoulder stretch 1. Sit or stand tall and glide your head backward as in the dorsal glide stretch. 2. Raise both arms so that your hands are next to your ears. 3. Take a deep breath, and as you breathe out, lower your elbows down and behind your back. You will feel your shoulder blades slide down and together, and at the same time you will feel a stretch across your chest and the front of your shoulders. 4. Hold for about 6 seconds, and then relax for up to 10 seconds. 5. Repeat 8 to 12 times. Strengthening: Hands on head 1. Move your head backward, forward, and side to side against gentle pressure from your hands, holding each position for about 6 seconds. 2. Repeat 8 to 12 times. Follow-up care is a key part of your treatment and safety. Be sure to make and go to all appointments, and call your doctor if you are having problems. It's also a good idea to know your test results and keep a list of the medicines you take. Where can you learn more? Go to http://www.boykin.com/ Enter P975 in the search box to learn more about \"Neck: Exercises. \" Current as of: March 2, 2020               Content Version: 12.6 © 2989-5474 [employer]. Care instructions adapted under license by [employer] (which disclaims liability or warranty for this information). If you have questions about a medical condition or this instruction, always ask your healthcare professional. [address] any warranty or liability for your use of this information. Carpal Tunnel Syndrome: Exercises Introduction Here are some examples of exercises for you to try. The exercises may be suggested for a condition or for rehabilitation. Start each exercise slowly. Ease off the exercises if you start to have pain. You will be told when to start these exercises and which ones will work best for you. Warm-up stretches When you no longer have pain or numbness, you can do exercises to help prevent carpal tunnel syndrome from coming back. Do not do any stretch or movement that is uncomfortable or painful. 1. Rotate your wrist up, down, and from side to side. Repeat 4 times. 2. Stretch your fingers far apart. Relax them, and then stretch them again. Repeat 4 times. 3. Stretch your thumb by pulling it back gently, holding it, and then releasing it. Repeat 4 times. How to do the exercises Prayer stretch 1. Start with your palms together in front of your chest just below your chin. 2. Slowly lower your hands toward your waistline, keeping your hands close to your stomach and your palms together until you feel a mild to moderate stretch under your forearms. 3. Hold for at least 15 to 30 seconds. Repeat 2 to 4 times. Wrist flexor stretch 1. Extend your arm in front of you with your palm up. 2. Bend your wrist, pointing your hand toward the floor. 3. With your other hand, gently bend your wrist farther until you feel a mild to moderate stretch in your forearm. 4. Hold for at least 15 to 30 seconds. Repeat 2 to 4 times. Wrist extensor stretch 1. Repeat steps 1 through 4 of the stretch above, but begin with your extended hand palm down. Follow-up care is a key part of your treatment and safety. Be sure to make and go to all appointments, and call your doctor if you are having problems. It's also a good idea to know your test results and keep a list of the medicines you take. Where can you learn more? Go to http://www.gray.com/ Enter E388 in the search box to learn more about \"Carpal Tunnel Syndrome: Exercises. \" Current as of: March 2, 2020               Content Version: 12.6 © 8507-7731 [employer]. Care instructions adapted under license by [employer] (which disclaims liability or warranty for this information). If you have questions about a medical condition or this instruction, always ask your healthcare professional. [address] any warranty or liability for your use of this information.

## 2020-12-14 NOTE — PROGRESS NOTES
Fit kit given to patient instruction explained patient expressed understanding    Discharge instructions reviewed with patient    Medication list and understanding of medications reviewed with patient. OTC and herbal medications reviewed and added to med list if applicable  Barriers to adherence assessed. Guidance given regarding new medications this visit, including reason for taking this medicine, and common side effects. AVS given to patient. Explained to patient. Patient expressed understanding.

## 2021-01-13 DIAGNOSIS — E03.9 ACQUIRED HYPOTHYROIDISM: ICD-10-CM

## 2021-01-13 DIAGNOSIS — I21.11 ST ELEVATION MYOCARDIAL INFARCTION INVOLVING RIGHT CORONARY ARTERY (HCC): ICD-10-CM

## 2021-01-14 ENCOUNTER — HOSPITAL ENCOUNTER (OUTPATIENT)
Dept: LAB | Age: 56
Discharge: HOME | End: 2021-01-14

## 2021-01-14 PROCEDURE — 82274 ASSAY TEST FOR BLOOD FECAL: CPT

## 2021-01-21 LAB — HEMOCCULT STL QL IA: NEGATIVE

## 2021-02-11 DIAGNOSIS — Z98.61 CAD S/P PERCUTANEOUS CORONARY ANGIOPLASTY: ICD-10-CM

## 2021-02-11 DIAGNOSIS — E03.9 ACQUIRED HYPOTHYROIDISM: ICD-10-CM

## 2021-02-11 DIAGNOSIS — I25.10 CAD S/P PERCUTANEOUS CORONARY ANGIOPLASTY: ICD-10-CM

## 2021-02-11 DIAGNOSIS — I21.11 ST ELEVATION MYOCARDIAL INFARCTION INVOLVING RIGHT CORONARY ARTERY (HCC): ICD-10-CM

## 2021-02-12 RX ORDER — LEVOTHYROXINE SODIUM 150 UG/1
150 TABLET ORAL
Qty: 30 TAB | Refills: 2 | Status: SHIPPED | OUTPATIENT
Start: 2021-02-12 | End: 2021-06-16 | Stop reason: SDUPTHER

## 2021-02-12 RX ORDER — METOPROLOL TARTRATE 25 MG/1
25 TABLET, FILM COATED ORAL EVERY 12 HOURS
Qty: 60 TAB | Refills: 2 | Status: SHIPPED | OUTPATIENT
Start: 2021-02-12 | End: 2021-06-16 | Stop reason: SDUPTHER

## 2021-02-12 RX ORDER — CLOPIDOGREL BISULFATE 75 MG/1
75 TABLET ORAL DAILY
Qty: 90 TAB | Refills: 0 | Status: SHIPPED | OUTPATIENT
Start: 2021-02-12 | End: 2021-06-16 | Stop reason: SDUPTHER

## 2021-06-16 DIAGNOSIS — E03.9 ACQUIRED HYPOTHYROIDISM: ICD-10-CM

## 2021-06-16 DIAGNOSIS — I25.10 CAD S/P PERCUTANEOUS CORONARY ANGIOPLASTY: ICD-10-CM

## 2021-06-16 DIAGNOSIS — Z98.61 CAD S/P PERCUTANEOUS CORONARY ANGIOPLASTY: ICD-10-CM

## 2021-06-16 DIAGNOSIS — I21.11 ST ELEVATION MYOCARDIAL INFARCTION INVOLVING RIGHT CORONARY ARTERY (HCC): ICD-10-CM

## 2021-06-17 RX ORDER — ATORVASTATIN CALCIUM 80 MG/1
80 TABLET, FILM COATED ORAL
Qty: 90 TABLET | Refills: 0 | Status: SHIPPED | OUTPATIENT
Start: 2021-06-17 | End: 2021-10-18 | Stop reason: SDUPTHER

## 2021-06-17 RX ORDER — LEVOTHYROXINE SODIUM 150 UG/1
150 TABLET ORAL
Qty: 30 TABLET | Refills: 2 | Status: SHIPPED | OUTPATIENT
Start: 2021-06-17 | End: 2021-10-18 | Stop reason: SDUPTHER

## 2021-06-17 RX ORDER — METOPROLOL TARTRATE 25 MG/1
25 TABLET, FILM COATED ORAL EVERY 12 HOURS
Qty: 60 TABLET | Refills: 2 | Status: SHIPPED | OUTPATIENT
Start: 2021-06-17 | End: 2021-10-18 | Stop reason: SDUPTHER

## 2021-06-17 RX ORDER — CLOPIDOGREL BISULFATE 75 MG/1
75 TABLET ORAL DAILY
Qty: 90 TABLET | Refills: 0 | Status: SHIPPED | OUTPATIENT
Start: 2021-06-17 | End: 2021-10-18 | Stop reason: SDUPTHER

## 2021-10-18 DIAGNOSIS — I25.10 CAD S/P PERCUTANEOUS CORONARY ANGIOPLASTY: ICD-10-CM

## 2021-10-18 DIAGNOSIS — I21.11 ST ELEVATION MYOCARDIAL INFARCTION INVOLVING RIGHT CORONARY ARTERY (HCC): ICD-10-CM

## 2021-10-18 DIAGNOSIS — E03.9 ACQUIRED HYPOTHYROIDISM: ICD-10-CM

## 2021-10-18 DIAGNOSIS — Z98.61 CAD S/P PERCUTANEOUS CORONARY ANGIOPLASTY: ICD-10-CM

## 2021-10-19 RX ORDER — METOPROLOL TARTRATE 25 MG/1
25 TABLET, FILM COATED ORAL EVERY 12 HOURS
Qty: 60 TABLET | Refills: 2 | Status: SHIPPED | OUTPATIENT
Start: 2021-10-19 | End: 2022-03-14 | Stop reason: SDUPTHER

## 2021-10-19 RX ORDER — LEVOTHYROXINE SODIUM 150 UG/1
150 TABLET ORAL
Qty: 30 TABLET | Refills: 2 | Status: SHIPPED | OUTPATIENT
Start: 2021-10-19 | End: 2022-03-14 | Stop reason: SDUPTHER

## 2021-10-19 RX ORDER — ATORVASTATIN CALCIUM 80 MG/1
80 TABLET, FILM COATED ORAL
Qty: 90 TABLET | Refills: 0 | Status: SHIPPED | OUTPATIENT
Start: 2021-10-19 | End: 2022-03-14 | Stop reason: SDUPTHER

## 2021-10-19 RX ORDER — CLOPIDOGREL BISULFATE 75 MG/1
75 TABLET ORAL DAILY
Qty: 90 TABLET | Refills: 0 | Status: SHIPPED | OUTPATIENT
Start: 2021-10-19 | End: 2022-03-14 | Stop reason: SDUPTHER

## 2022-03-14 ENCOUNTER — HOSPITAL ENCOUNTER (OUTPATIENT)
Dept: LAB | Age: 57
Discharge: HOME | End: 2022-03-14

## 2022-03-14 ENCOUNTER — OFFICE VISIT (OUTPATIENT)
Dept: FAMILY MEDICINE CLINIC | Facility: CLINIC | Age: 57
End: 2022-03-14

## 2022-03-14 VITALS
WEIGHT: 224 LBS | HEIGHT: 64 IN | HEART RATE: 64 BPM | OXYGEN SATURATION: 99 % | BODY MASS INDEX: 38.24 KG/M2 | SYSTOLIC BLOOD PRESSURE: 192 MMHG | TEMPERATURE: 97.5 F | RESPIRATION RATE: 18 BRPM | DIASTOLIC BLOOD PRESSURE: 77 MMHG

## 2022-03-14 DIAGNOSIS — E03.9 ACQUIRED HYPOTHYROIDISM: ICD-10-CM

## 2022-03-14 DIAGNOSIS — Z12.11 SCREENING FOR COLON CANCER: Primary | ICD-10-CM

## 2022-03-14 DIAGNOSIS — I25.10 CAD S/P PERCUTANEOUS CORONARY ANGIOPLASTY: ICD-10-CM

## 2022-03-14 DIAGNOSIS — R73.03 PRE-DIABETES: ICD-10-CM

## 2022-03-14 DIAGNOSIS — I21.11 ST ELEVATION MYOCARDIAL INFARCTION INVOLVING RIGHT CORONARY ARTERY (HCC): ICD-10-CM

## 2022-03-14 DIAGNOSIS — N39.0 URINARY TRACT INFECTION WITHOUT HEMATURIA, SITE UNSPECIFIED: ICD-10-CM

## 2022-03-14 DIAGNOSIS — Z00.00 REGULAR CHECK-UP: ICD-10-CM

## 2022-03-14 DIAGNOSIS — Z98.61 CAD S/P PERCUTANEOUS CORONARY ANGIOPLASTY: ICD-10-CM

## 2022-03-14 LAB
ALBUMIN SERPL-MCNC: 3.5 G/DL (ref 3.4–5)
ALBUMIN/GLOB SERPL: 0.9 {RATIO} (ref 0.8–1.7)
ALP SERPL-CCNC: 62 U/L (ref 45–117)
ALT SERPL-CCNC: 26 U/L (ref 13–56)
ANION GAP SERPL CALC-SCNC: 2 MMOL/L (ref 3–18)
AST SERPL-CCNC: 15 U/L (ref 10–38)
BASOPHILS # BLD: 0.1 K/UL (ref 0–0.1)
BASOPHILS NFR BLD: 1 % (ref 0–2)
BILIRUB SERPL-MCNC: 0.6 MG/DL (ref 0.2–1)
BILIRUB UR QL STRIP: NEGATIVE
BUN SERPL-MCNC: 12 MG/DL (ref 7–18)
BUN/CREAT SERPL: 13 (ref 12–20)
CALCIUM SERPL-MCNC: 8.8 MG/DL (ref 8.5–10.1)
CHLORIDE SERPL-SCNC: 107 MMOL/L (ref 100–111)
CHOLEST SERPL-MCNC: 213 MG/DL
CO2 SERPL-SCNC: 31 MMOL/L (ref 21–32)
CREAT SERPL-MCNC: 0.9 MG/DL (ref 0.6–1.3)
DIFFERENTIAL METHOD BLD: ABNORMAL
EOSINOPHIL # BLD: 0.4 K/UL (ref 0–0.4)
EOSINOPHIL NFR BLD: 4 % (ref 0–5)
ERYTHROCYTE [DISTWIDTH] IN BLOOD BY AUTOMATED COUNT: 14.5 % (ref 11.6–14.5)
EST. AVERAGE GLUCOSE BLD GHB EST-MCNC: 128 MG/DL
GLOBULIN SER CALC-MCNC: 4.1 G/DL (ref 2–4)
GLUCOSE SERPL-MCNC: 119 MG/DL (ref 74–99)
GLUCOSE UR-MCNC: NEGATIVE MG/DL
HBA1C MFR BLD: 6.1 % (ref 4.2–5.6)
HCT VFR BLD AUTO: 45.2 % (ref 35–45)
HDLC SERPL-MCNC: 54 MG/DL (ref 40–60)
HDLC SERPL: 3.9 {RATIO} (ref 0–5)
HGB BLD-MCNC: 14 G/DL (ref 12–16)
IMM GRANULOCYTES # BLD AUTO: 0 K/UL (ref 0–0.04)
IMM GRANULOCYTES NFR BLD AUTO: 0 % (ref 0–0.5)
KETONES P FAST UR STRIP-MCNC: NEGATIVE MG/DL
LDLC SERPL CALC-MCNC: 114.2 MG/DL (ref 0–100)
LIPID PROFILE,FLP: ABNORMAL
LYMPHOCYTES # BLD: 2 K/UL (ref 0.9–3.6)
LYMPHOCYTES NFR BLD: 19 % (ref 21–52)
MCH RBC QN AUTO: 27.6 PG (ref 24–34)
MCHC RBC AUTO-ENTMCNC: 31 G/DL (ref 31–37)
MCV RBC AUTO: 89 FL (ref 78–100)
MONOCYTES # BLD: 0.6 K/UL (ref 0.05–1.2)
MONOCYTES NFR BLD: 6 % (ref 3–10)
NEUTS SEG # BLD: 7.5 K/UL (ref 1.8–8)
NEUTS SEG NFR BLD: 70 % (ref 40–73)
NRBC # BLD: 0 K/UL (ref 0–0.01)
NRBC BLD-RTO: 0 PER 100 WBC
PH UR STRIP: 6 [PH] (ref 4.6–8)
PLATELET # BLD AUTO: 326 K/UL (ref 135–420)
PMV BLD AUTO: 10.4 FL (ref 9.2–11.8)
POTASSIUM SERPL-SCNC: 4.7 MMOL/L (ref 3.5–5.5)
PROT SERPL-MCNC: 7.6 G/DL (ref 6.4–8.2)
PROT UR QL STRIP: NEGATIVE
RBC # BLD AUTO: 5.08 M/UL (ref 4.2–5.3)
SODIUM SERPL-SCNC: 140 MMOL/L (ref 136–145)
SP GR UR STRIP: 1.02 (ref 1–1.03)
T4 FREE SERPL-MCNC: 1 NG/DL (ref 0.7–1.5)
TRIGL SERPL-MCNC: 224 MG/DL (ref ?–150)
TSH SERPL DL<=0.05 MIU/L-ACNC: 8.42 UIU/ML (ref 0.36–3.74)
UA UROBILINOGEN AMB POC: NORMAL (ref 0.2–1)
URINALYSIS CLARITY POC: CLEAR
URINALYSIS COLOR POC: YELLOW
URINE BLOOD POC: NORMAL
URINE LEUKOCYTES POC: NORMAL
URINE NITRITES POC: POSITIVE
VLDLC SERPL CALC-MCNC: 44.8 MG/DL
WBC # BLD AUTO: 10.6 K/UL (ref 4.6–13.2)

## 2022-03-14 PROCEDURE — 87186 SC STD MICRODIL/AGAR DIL: CPT

## 2022-03-14 PROCEDURE — 84443 ASSAY THYROID STIM HORMONE: CPT

## 2022-03-14 PROCEDURE — 83036 HEMOGLOBIN GLYCOSYLATED A1C: CPT

## 2022-03-14 PROCEDURE — 87077 CULTURE AEROBIC IDENTIFY: CPT

## 2022-03-14 PROCEDURE — 80061 LIPID PANEL: CPT

## 2022-03-14 PROCEDURE — 84439 ASSAY OF FREE THYROXINE: CPT

## 2022-03-14 PROCEDURE — 85025 COMPLETE CBC W/AUTO DIFF WBC: CPT

## 2022-03-14 PROCEDURE — 99214 OFFICE O/P EST MOD 30 MIN: CPT | Performed by: FAMILY MEDICINE

## 2022-03-14 PROCEDURE — 81003 URINALYSIS AUTO W/O SCOPE: CPT | Performed by: FAMILY MEDICINE

## 2022-03-14 PROCEDURE — 80053 COMPREHEN METABOLIC PANEL: CPT

## 2022-03-14 PROCEDURE — 87086 URINE CULTURE/COLONY COUNT: CPT

## 2022-03-14 RX ORDER — CLOPIDOGREL BISULFATE 75 MG/1
75 TABLET ORAL DAILY
Qty: 90 TABLET | Refills: 1 | Status: SHIPPED | OUTPATIENT
Start: 2022-03-14 | End: 2022-10-06 | Stop reason: SDUPTHER

## 2022-03-14 RX ORDER — ATORVASTATIN CALCIUM 80 MG/1
80 TABLET, FILM COATED ORAL
Qty: 90 TABLET | Refills: 1 | Status: SHIPPED | OUTPATIENT
Start: 2022-03-14 | End: 2022-03-14 | Stop reason: SDUPTHER

## 2022-03-14 RX ORDER — AMLODIPINE BESYLATE 5 MG/1
5 TABLET ORAL DAILY
Qty: 90 TABLET | Refills: 1 | Status: SHIPPED | OUTPATIENT
Start: 2022-03-14 | End: 2022-10-06 | Stop reason: SDUPTHER

## 2022-03-14 RX ORDER — AMLODIPINE BESYLATE 5 MG/1
5 TABLET ORAL DAILY
Qty: 90 TABLET | Refills: 1 | Status: SHIPPED | OUTPATIENT
Start: 2022-03-14 | End: 2022-03-14 | Stop reason: SDUPTHER

## 2022-03-14 RX ORDER — CEPHALEXIN 500 MG/1
500 CAPSULE ORAL 3 TIMES DAILY
Qty: 21 CAPSULE | Refills: 0 | Status: SHIPPED | OUTPATIENT
Start: 2022-03-14 | End: 2022-03-21

## 2022-03-14 RX ORDER — METOPROLOL TARTRATE 25 MG/1
25 TABLET, FILM COATED ORAL EVERY 12 HOURS
Qty: 90 TABLET | Refills: 1 | Status: SHIPPED | OUTPATIENT
Start: 2022-03-14 | End: 2022-10-06 | Stop reason: SDUPTHER

## 2022-03-14 RX ORDER — CLOPIDOGREL BISULFATE 75 MG/1
75 TABLET ORAL DAILY
Qty: 90 TABLET | Refills: 1 | Status: SHIPPED | OUTPATIENT
Start: 2022-03-14 | End: 2022-03-14 | Stop reason: SDUPTHER

## 2022-03-14 RX ORDER — ATORVASTATIN CALCIUM 80 MG/1
80 TABLET, FILM COATED ORAL
Qty: 90 TABLET | Refills: 1 | Status: SHIPPED | OUTPATIENT
Start: 2022-03-14 | End: 2022-03-28 | Stop reason: SINTOL

## 2022-03-14 RX ORDER — LEVOTHYROXINE SODIUM 150 UG/1
150 TABLET ORAL
Qty: 90 TABLET | Refills: 1 | Status: SHIPPED | OUTPATIENT
Start: 2022-03-14 | End: 2022-10-06 | Stop reason: SDUPTHER

## 2022-03-14 RX ORDER — METOPROLOL TARTRATE 25 MG/1
25 TABLET, FILM COATED ORAL EVERY 12 HOURS
Qty: 90 TABLET | Refills: 1 | Status: SHIPPED | OUTPATIENT
Start: 2022-03-14 | End: 2022-03-14 | Stop reason: SDUPTHER

## 2022-03-14 RX ORDER — LEVOTHYROXINE SODIUM 150 UG/1
150 TABLET ORAL
Qty: 90 TABLET | Refills: 1 | Status: SHIPPED | OUTPATIENT
Start: 2022-03-14 | End: 2022-03-14 | Stop reason: SDUPTHER

## 2022-03-14 NOTE — PATIENT INSTRUCTIONS
Carpal Tunnel Syndrome: Exercises  Introduction  Here are some examples of exercises for you to try. The exercises may be suggested for a condition or for rehabilitation. Start each exercise slowly. Ease off the exercises if you start to have pain. You will be told when to start these exercises and which ones will work best for you. Warm-up stretches  When you no longer have pain or numbness, you can do exercises to help prevent carpal tunnel syndrome from coming back. Do not do any stretch or movement that is uncomfortable or painful. 1. Rotate your wrist up, down, and from side to side. Repeat 4 times. 2. Stretch your fingers far apart. Relax them, and then stretch them again. Repeat 4 times. 3. Stretch your thumb by pulling it back gently, holding it, and then releasing it. Repeat 4 times. How to do the exercises  Prayer stretch    1. Start with your palms together in front of your chest just below your chin. 2. Slowly lower your hands toward your waistline, keeping your hands close to your stomach and your palms together until you feel a mild to moderate stretch under your forearms. 3. Hold for at least 15 to 30 seconds. Repeat 2 to 4 times. Wrist flexor stretch    1. Extend your arm in front of you with your palm up. 2. Bend your wrist, pointing your hand toward the floor. 3. With your other hand, gently bend your wrist farther until you feel a mild to moderate stretch in your forearm. 4. Hold for at least 15 to 30 seconds. Repeat 2 to 4 times. Wrist extensor stretch    1. Repeat steps 1 through 4 of the stretch above, but begin with your extended hand palm down. Follow-up care is a key part of your treatment and safety. Be sure to make and go to all appointments, and call your doctor if you are having problems. It's also a good idea to know your test results and keep a list of the medicines you take. Where can you learn more?   Go to http://www.gray.com/  Enter J119 in the search box to learn more about \"Carpal Tunnel Syndrome: Exercises. \"  Current as of: July 1, 2021               Content Version: 13.2  © 2006-2022 [employer]. Care instructions adapted under license by [employer] (which disclaims liability or warranty for this information). If you have questions about a medical condition or this instruction, always ask your healthcare professional. [place] [address] any warranty or liability for your use of this information.

## 2022-03-14 NOTE — PROGRESS NOTES
Patient presents for lab draw ordered by:    Ordering Provider: Dr. Ryan Cary Department/Practice:  [address]  Phone:  [phone]  Date Ordered:  3/14/21    The [facility]:    CBC, Lipid Profile, CMP, TSH, 3rd Generation, HgA1C and T4    The following tubes were sent:    Gold  ( 2) and Lavender  ( 1)    Draw site left brachial.  Patient tolerated draw with no distress.

## 2022-03-14 NOTE — PROGRESS NOTES
Fit kit given to the patient instructions explained patient expressed understands instruction    Blood pressure cuff and blood pressure log given to the patient    Good Rx coupons for  lipitor and plavix given to the patient     Discharge instructions reviewed with patient    Medication list and understanding of medications reviewed with patient. OTC and herbal medications reviewed and added to med list if applicable  Barriers to adherence assessed. Guidance given regarding new medications this visit, including reason for taking this medicine, and common side effects. AVS given to patient. Explained to patient. Patient expressed understanding.

## 2022-03-15 NOTE — PROGRESS NOTES
JABARI Suggs is a 60 y.o. female being seen today for   Chief Complaint   Patient presents with    Medication Refill   . follow up for this pt with hypothyroid and CAD. she states that she needs med refill. She is out of plavix x a few days and low on her other meds. Per pt she does take all her meds daily. She is surprised her bp is up today. On chart review she should be out of meds x a few months based on refills but pt says that does not sound right as she has had meds and taking. Also she states her urine has a strong smell. No dysuria. She has a history of carpal tunnel and has had surgery on the right side. Lately her left side is acting up. She has a splint but has not been using it consistently. when she does, it helps. Past Medical History:   Diagnosis Date    CAD (coronary artery disease)     [facility]     Diabetes Type II reported by patient    GERD (gastroesophageal reflux disease)     H/O screening mammography 12/03/2019    no evidence of malignancy    Hx of abnormal cervical Pap smear     s/p hysterectomy     Hypercholesterolemia     Hypertension     STEMI (ST elevation myocardial infarction) ([facility] [address] 12/2019    Thyroid disease     hypothyroid         ROS  Patient states that she is feeling well. Denies complaints of chest pain, shortness of breath, swelling of legs, dizziness or weakness. she denies nausea, vomiting or diarrhea. Current Outpatient Medications   Medication Sig    atorvastatin (LIPITOR) 80 mg tablet Take 1 Tablet by mouth nightly. Indications: EVAN    clopidogreL (Plavix) 75 mg tab Take 1 Tablet by mouth daily. Indications: blood clot prevention following percutaneous coronary intervention, EVAN    levothyroxine (SYNTHROID) 150 mcg tablet Take 1 Tablet by mouth Daily (before breakfast).  metoprolol tartrate (LOPRESSOR) 25 mg tablet Take 1 Tablet by mouth every twelve (12) hours.  Indications: EVAN    amLODIPine (NORVASC) 5 mg tablet Take 1 Tablet by mouth daily.  cephALEXin (KEFLEX) 500 mg capsule Take 1 Capsule by mouth three (3) times daily for 7 days.  Omeprazole delayed release (PRILOSEC D/R) 20 mg tablet Take 1 Tab by mouth daily.  aspirin 81 mg chewable tablet Take 1 Tab by mouth daily.  fluticasone propionate (FLONASE) 50 mcg/actuation nasal spray 2 Sprays by Both Nostrils route daily as needed for Allergies. (Patient not taking: Reported on 3/14/2022)     No current facility-administered medications for this visit. PE  Visit Vitals  BP (!) 192/77 (BP 1 Location: Left upper arm, BP Patient Position: Sitting, BP Cuff Size: Large adult)   Pulse 64   Temp 97.5 °F (36.4 °C) (Temporal)   Resp 18   Ht 5' 4\" (1.626 m)   Wt 224 lb (101.6 kg)   SpO2 99%   BMI 38.45 kg/m²        Alert and oriented with normal mood and affect. she is well developed and well nourished . Lungs are clear without wheezing. Heart rate is regular without murmurs or gallops. There is no lower extremity edema. Results for orders placed or performed in visit on 03/14/22   AMB POC URINALYSIS DIP STICK AUTO W/O MICRO   Result Value Ref Range    Color (UA POC) Yellow     Clarity (UA POC) Clear     Glucose (UA POC) Negative Negative    Bilirubin (UA POC) Negative Negative    Ketones (UA POC) Negative Negative    Specific gravity (UA POC) 1.020 1.035    Blood (UA POC) Trace Negative    pH (UA POC) 6 4.6 - 8.0    Protein (UA POC) Negative Negative    Urobilinogen (UA POC) 0.2 mg/dL 0.2 - 1    Nitrites (UA POC) Positive Negative    Leukocyte esterase (UA POC) 1+ Negative         Assessment and Plan:        ICD-10-CM ICD-9-CM    1. Screening for colon cancer  Z12.11 V76.51 OCCULT BLOOD IMMUNOASSAY,DIAGNOSTIC      CANCELED: OCCULT BLOOD IMMUNOASSAY,DIAGNOSTIC   2.  ST elevation myocardial infarction involving right coronary artery (HCC)  I21.11 410.31 TSH 3RD GENERATION      T4, FREE      HEMOGLOBIN A1C WITH EAG      METABOLIC PANEL, COMPREHENSIVE      CBC WITH AUTOMATED DIFF      LIPID PANEL      atorvastatin (LIPITOR) 80 mg tablet      clopidogreL (Plavix) 75 mg tab      metoprolol tartrate (LOPRESSOR) 25 mg tablet      DISCONTINUED: atorvastatin (LIPITOR) 80 mg tablet      DISCONTINUED: clopidogreL (Plavix) 75 mg tab      DISCONTINUED: metoprolol tartrate (LOPRESSOR) 25 mg tablet   3. Pre-diabetes  R73.03 790.29 TSH 3RD GENERATION      T4, FREE      HEMOGLOBIN A1C WITH EAG      METABOLIC PANEL, COMPREHENSIVE      CBC WITH AUTOMATED DIFF      LIPID PANEL   4. Acquired hypothyroidism  E03.9 244.9 TSH 3RD GENERATION      T4, FREE      HEMOGLOBIN A1C WITH EAG      METABOLIC PANEL, COMPREHENSIVE      CBC WITH AUTOMATED DIFF      LIPID PANEL      levothyroxine (SYNTHROID) 150 mcg tablet      DISCONTINUED: levothyroxine (SYNTHROID) 150 mcg tablet   5. ST elevation myocardial infarction involving right coronary artery (HCC)  I21.11 410.31 TSH 3RD GENERATION      T4, FREE      HEMOGLOBIN A1C WITH EAG      METABOLIC PANEL, COMPREHENSIVE      CBC WITH AUTOMATED DIFF      LIPID PANEL      atorvastatin (LIPITOR) 80 mg tablet      clopidogreL (Plavix) 75 mg tab      metoprolol tartrate (LOPRESSOR) 25 mg tablet      DISCONTINUED: atorvastatin (LIPITOR) 80 mg tablet      DISCONTINUED: clopidogreL (Plavix) 75 mg tab      DISCONTINUED: metoprolol tartrate (LOPRESSOR) 25 mg tablet    Subsequent encounter   6. CAD S/P percutaneous coronary angioplasty  I25.10 414.01 atorvastatin (LIPITOR) 80 mg tablet    Z98.61 V45.82 clopidogreL (Plavix) 75 mg tab      metoprolol tartrate (LOPRESSOR) 25 mg tablet      DISCONTINUED: atorvastatin (LIPITOR) 80 mg tablet      DISCONTINUED: clopidogreL (Plavix) 75 mg tab      DISCONTINUED: metoprolol tartrate (LOPRESSOR) 25 mg tablet   7. Regular check-up  Z00.00 V70.0 AMB POC URINALYSIS DIP STICK AUTO W/O MICRO   8.  Urinary tract infection without hematuria, site unspecified  N39.0 599.0 CULTURE, URINE     Keflex for probable UTI  Splint and exercises for carpal tunnel. For blood pressure, will add norvasc 5 and refill her other meds.   Advised she needs to take daily and consider getting a pillbox to help her with compliance    Follow up in one month recheck bp      Dariana Francisco MD

## 2022-03-17 LAB
BACTERIA SPEC CULT: ABNORMAL
CC UR VC: ABNORMAL
SERVICE CMNT-IMP: ABNORMAL

## 2022-03-21 ENCOUNTER — HOSPITAL ENCOUNTER (OUTPATIENT)
Dept: LAB | Age: 57
Discharge: HOME | End: 2022-03-21

## 2022-03-21 DIAGNOSIS — Z12.11 SCREENING FOR COLON CANCER: ICD-10-CM

## 2022-03-21 PROCEDURE — 82274 ASSAY TEST FOR BLOOD FECAL: CPT

## 2022-03-28 ENCOUNTER — TELEPHONE (OUTPATIENT)
Dept: FAMILY MEDICINE CLINIC | Facility: CLINIC | Age: 57
End: 2022-03-28

## 2022-03-28 ENCOUNTER — VIRTUAL VISIT (OUTPATIENT)
Dept: FAMILY MEDICINE CLINIC | Facility: CLINIC | Age: 57
End: 2022-03-28

## 2022-03-28 DIAGNOSIS — E78.2 MIXED HYPERLIPIDEMIA: ICD-10-CM

## 2022-03-28 DIAGNOSIS — R73.03 PRE-DIABETES: ICD-10-CM

## 2022-03-28 DIAGNOSIS — I10 ESSENTIAL HYPERTENSION: Primary | ICD-10-CM

## 2022-03-28 DIAGNOSIS — I21.3 ST ELEVATION MYOCARDIAL INFARCTION (STEMI), UNSPECIFIED ARTERY (HCC): ICD-10-CM

## 2022-03-28 PROCEDURE — 99441 PR PHYS/QHP TELEPHONE EVALUATION 5-10 MIN: CPT | Performed by: FAMILY MEDICINE

## 2022-03-28 RX ORDER — ROSUVASTATIN CALCIUM 20 MG/1
20 TABLET, COATED ORAL
Qty: 90 TABLET | Refills: 1 | Status: SHIPPED | OUTPATIENT
Start: 2022-03-28 | End: 2022-10-07 | Stop reason: ALTCHOICE

## 2022-03-28 NOTE — PROGRESS NOTES
JABARI Crandall is a 60 y.o. female being seen today for No chief complaint on file. .  she states that her urime symptoms are gone. Started amlodipine and her bp went down from 160s and 150s/80s down to 130s/60s and 70s. She has noticed she has leg cramps with the lipitor    Started taking osteo biflex (glucosamine chondrioitin) but cannot say if it is helping yet. Telephone visit due to covid precautions. Past Medical History:   Diagnosis Date    CAD (coronary artery disease)     Diabetes ([facility] [address]     Diabetes Type II reported by patient    GERD (gastroesophageal reflux disease)     H/O screening mammography 12/03/2019    no evidence of malignancy    Hx of abnormal cervical Pap smear     s/p hysterectomy     Hypercholesterolemia     Hypertension     [facility]    Thyroid disease     hypothyroid         ROS  Patient states that she is feeling well. Denies complaints of chest pain, shortness of breath, swelling of legs, dizziness or weakness. she denies nausea, vomiting or diarrhea. Current Outpatient Medications   Medication Sig    rosuvastatin (CRESTOR) 20 mg tablet Take 1 Tablet by mouth nightly.  clopidogreL (Plavix) 75 mg tab Take 1 Tablet by mouth daily. Indications: blood clot prevention following percutaneous coronary intervention, EVAN    levothyroxine (SYNTHROID) 150 mcg tablet Take 1 Tablet by mouth Daily (before breakfast).  metoprolol tartrate (LOPRESSOR) 25 mg tablet Take 1 Tablet by mouth every twelve (12) hours. Indications: EVAN    amLODIPine (NORVASC) 5 mg tablet Take 1 Tablet by mouth daily.  fluticasone propionate (FLONASE) 50 mcg/actuation nasal spray 2 Sprays by Both Nostrils route daily as needed for Allergies. (Patient not taking: Reported on 3/14/2022)    Omeprazole delayed release (PRILOSEC D/R) 20 mg tablet Take 1 Tab by mouth daily.  aspirin 81 mg chewable tablet Take 1 Tab by mouth daily. No current facility-administered medications for this visit. PE  There were no vitals taken for this visit. Alert and oriented with normal mood and affect. Assessment and Plan:        ICD-10-CM ICD-9-CM    1. Essential hypertension  I10 401.9    2. Mixed hyperlipidemia  E78.2 272.2    3. ST elevation myocardial infarction (STEMI), unspecified artery (HCC)  I21.3 410.90    4. Pre-diabetes  R73.03 790.29        Reviewed her recent labs. She declines a medicine for blood sugar and will work on diet and weight. bp is better. Continue current    Trial crestor instead of lipitor due to side effect. Follow up 6 mos    Pauline Andrade MD    Telephone call 10 minutes    Pursuant to the emergency declaration under the [facility], 4393 waiver authority and the [employer] and [employer] Act, this Virtual  Visit was conducted, with patient's consent, to reduce the patient's risk of exposure to COVID-19 and provide continuity of care for an established patient.

## 2022-03-29 LAB — HEMOCCULT STL QL IA: NEGATIVE

## 2022-06-13 RX ORDER — CEPHALEXIN 500 MG/1
CAPSULE ORAL
Qty: 21 CAPSULE | Refills: 0 | OUTPATIENT
Start: 2022-06-13

## 2022-06-13 NOTE — TELEPHONE ENCOUNTER
This pt has put in a refill on antibiotic. The last time we gave it to her was for a UTI but I dont have any details about what is going on with her. Can you call to see if she has UTI again? Can she come in today?  thx

## 2022-10-06 DIAGNOSIS — I25.10 CAD S/P PERCUTANEOUS CORONARY ANGIOPLASTY: ICD-10-CM

## 2022-10-06 DIAGNOSIS — I21.11 ST ELEVATION MYOCARDIAL INFARCTION INVOLVING RIGHT CORONARY ARTERY (HCC): ICD-10-CM

## 2022-10-06 DIAGNOSIS — Z98.61 CAD S/P PERCUTANEOUS CORONARY ANGIOPLASTY: ICD-10-CM

## 2022-10-06 DIAGNOSIS — E03.9 ACQUIRED HYPOTHYROIDISM: ICD-10-CM

## 2022-10-06 NOTE — TELEPHONE ENCOUNTER
Patient stated she prefer Lipitor over the Crestor. Patient stated she never started taking the crestor because it has more side effect than the lipitor.     LOV: 3/14/22    Patient is scheduled for an follow up visit on 12/12/2022 at [facility]

## 2022-10-07 RX ORDER — AMLODIPINE BESYLATE 5 MG/1
5 TABLET ORAL DAILY
Qty: 90 TABLET | Refills: 0 | Status: SHIPPED | OUTPATIENT
Start: 2022-10-07

## 2022-10-07 RX ORDER — CLOPIDOGREL BISULFATE 75 MG/1
75 TABLET ORAL DAILY
Qty: 90 TABLET | Refills: 0 | Status: SHIPPED | OUTPATIENT
Start: 2022-10-07

## 2022-10-07 RX ORDER — METOPROLOL TARTRATE 25 MG/1
25 TABLET, FILM COATED ORAL EVERY 12 HOURS
Qty: 90 TABLET | Refills: 0 | Status: SHIPPED | OUTPATIENT
Start: 2022-10-07

## 2022-10-07 RX ORDER — ATORVASTATIN CALCIUM 80 MG/1
80 TABLET, FILM COATED ORAL DAILY
Qty: 90 TABLET | Refills: 0 | Status: SHIPPED | OUTPATIENT
Start: 2022-10-07

## 2022-10-07 RX ORDER — LEVOTHYROXINE SODIUM 150 UG/1
150 TABLET ORAL
Qty: 90 TABLET | Refills: 0 | Status: SHIPPED | OUTPATIENT
Start: 2022-10-07

## (undated) DEVICE — PRESSURE MONITORING SET: Brand: TRUWAVE

## (undated) DEVICE — CATHETER GUID EXTRA BACKUP 3.5 0.070IN 6FR 100CM VISTA BRITE TIP

## (undated) DEVICE — COPILOT BLEEDBACK CONTROL VALVE: Brand: COPILOT

## (undated) DEVICE — INTRODUCER SHTH 6FR CANN L11CM DIL TIP 35MM GRN TUNGSTEN

## (undated) DEVICE — Device: Brand: PROWATER

## (undated) DEVICE — TREK CORONARY DILATATION CATHETER 2.50 MM X 12 MM / RAPID-EXCHANGE: Brand: TREK

## (undated) DEVICE — CATHETER ANGIO JR4 STD 0.038 IN 6 FRX100 CM SUPER TORQUE +

## (undated) DEVICE — NC TREK CORONARY DILATATION CATHETER 3.0 MM X 8 MM / RAPID-EXCHANGE: Brand: NC TREK

## (undated) DEVICE — COVER US PRB W15XL120CM W/ GEL RUBBERBAND TAPE STRP FLD GEN

## (undated) DEVICE — PROCEDURE KIT FLUID MGMT 10 FR CUST MAINFOLD

## (undated) DEVICE — CATHETER ANGIO 6FR L110CM 145DEG VENT POLYUR PGTL 6 SIDE H

## (undated) DEVICE — PACK PROCEDURE SURG VASC CATH 161 MMC LF

## (undated) DEVICE — ANGIOGRAPHY KIT CUST VASC

## (undated) DEVICE — CATHETER DIAG AD L100CM DIA6FR STD JUDKINS L 4 POLYUR COR

## (undated) DEVICE — DEVICE INFL W ACCS + HEMSTAS VLV INSRT TOOL AND TORQ BASIX

## (undated) DEVICE — SET FLD ADMIN 3 W STPCOCK FIX FEM L BOR 1IN

## (undated) DEVICE — SUTURE PERMA HND SZ 0 L18IN NONABSORBABLE BLK L30MM PSL REV 580H